# Patient Record
Sex: FEMALE | Race: BLACK OR AFRICAN AMERICAN | NOT HISPANIC OR LATINO | Employment: OTHER | URBAN - METROPOLITAN AREA
[De-identification: names, ages, dates, MRNs, and addresses within clinical notes are randomized per-mention and may not be internally consistent; named-entity substitution may affect disease eponyms.]

---

## 2022-02-11 ENCOUNTER — OFFICE VISIT (OUTPATIENT)
Dept: INTERNAL MEDICINE CLINIC | Facility: CLINIC | Age: 62
End: 2022-02-11
Payer: COMMERCIAL

## 2022-02-11 VITALS
SYSTOLIC BLOOD PRESSURE: 146 MMHG | OXYGEN SATURATION: 99 % | BODY MASS INDEX: 39.27 KG/M2 | TEMPERATURE: 98 F | HEART RATE: 80 BPM | HEIGHT: 67 IN | DIASTOLIC BLOOD PRESSURE: 90 MMHG | WEIGHT: 250.2 LBS

## 2022-02-11 DIAGNOSIS — R03.0 BORDERLINE HYPERTENSION: ICD-10-CM

## 2022-02-11 DIAGNOSIS — Z00.00 ANNUAL PHYSICAL EXAM: Primary | ICD-10-CM

## 2022-02-11 DIAGNOSIS — R21 RASH: ICD-10-CM

## 2022-02-11 DIAGNOSIS — Z11.59 NEED FOR HEPATITIS C SCREENING TEST: ICD-10-CM

## 2022-02-11 DIAGNOSIS — Z12.31 ENCOUNTER FOR SCREENING MAMMOGRAM FOR BREAST CANCER: ICD-10-CM

## 2022-02-11 PROCEDURE — 99386 PREV VISIT NEW AGE 40-64: CPT | Performed by: FAMILY MEDICINE

## 2022-02-11 RX ORDER — CLOTRIMAZOLE AND BETAMETHASONE DIPROPIONATE 10; .64 MG/G; MG/G
CREAM TOPICAL 2 TIMES DAILY
Qty: 30 G | Refills: 0 | Status: SHIPPED | OUTPATIENT
Start: 2022-02-11

## 2022-02-11 RX ORDER — FLUTICASONE PROPIONATE 50 MCG
2 SPRAY, SUSPENSION (ML) NASAL AS NEEDED
COMMUNITY
Start: 2014-01-21

## 2022-02-11 RX ORDER — LORATADINE AND PSEUDOEPHEDRINE SULFATE 5; 120 MG/1; MG/1
1 TABLET, EXTENDED RELEASE ORAL AS NEEDED
COMMUNITY
Start: 2014-01-21

## 2022-02-11 NOTE — PROGRESS NOTES
ADULT ANNUAL [address]     NAME: Shmuel Young  AGE: 59 y o  SEX: female  : 1960     DATE: 2022     Assessment and Plan:     Problem List Items Addressed This Visit     None      Visit Diagnoses     Annual physical exam    -  Primary    Relevant Orders    Comprehensive metabolic panel (Completed)    Lipid Panel with Direct LDL reflex (Completed)    TSH, 3rd generation with Free T4 reflex (Completed)    Need for hepatitis C screening test        Relevant Orders    Hepatitis C Antibody ([facility]) (Completed)    Encounter for immunization        Encounter for screening mammogram for breast cancer        Relevant Orders    Mammo screening bilateral w 3d & cad    Rash        Relevant Medications    clotrimazole-betamethasone (LOTRISONE) 1-0 05 % cream    Borderline hypertension          Borderline HTN  pt encouraged to keep ambulatory BP log and f/u in a few weeks  Refill of Lotrisone given for recurrent dermatitis per pt  Screening labs ordered  Immunizations and preventive care screenings were discussed with patient today  Appropriate education was printed on patient's after visit summary  Counseling:  · Exercise: the importance of regular exercise/physical activity was discussed  Recommend exercise 3-5 times per week for at least 30 minutes  BMI Counseling: Body mass index is 39 19 kg/m²  The BMI is above normal  Nutrition recommendations include encouraging healthy choices of fruits and vegetables, limiting drinks that contain sugar and reducing intake of cholesterol  Exercise recommendations include exercising 3-5 times per week  Rationale for BMI follow-up plan is due to patient being overweight or obese  Depression Screening and Follow-up Plan: Patient was screened for depression during today's encounter  They screened negative with a PHQ-2 score of 0          Return in about 3 weeks (around 3/4/2022) for Recheck  Chief Complaint:     Chief Complaint   Patient presents with    Hypertension     Patient stated she was in urgent care a few days ago and her BP was about 170/100 and her BP has never been that high ever as per the patient     Mass     Patient noticed a lump on her left arm as per the patient and she would like to have it looked at      History of Present Illness:     Adult Annual Physical   Patient here for a comprehensive physical exam    Denies chronic medical illness  Family hx htn and dm2    Family hx of breast cancer or colon cancer   C/o lump on left forearm  noticed it on Sunday  Doesn't bother her  Thinks it may be bug bite  Went to the urgent care a few days ago and BP was very high  No hx of hypertension  Never on medication  Diet and Physical Activity  · Diet/Nutrition: limited junk food  · Exercise: walking  Depression Screening  PHQ-2/9 Depression Screening    Little interest or pleasure in doing things: 0 - not at all  Feeling down, depressed, or hopeless: 0 - not at all  PHQ-2 Score: 0  PHQ-2 Interpretation: Negative depression screen       General Health  · Sleep: gets 4-6 hours of sleep on average  · Hearing: normal - bilateral   · Vision: no vision problems  · Dental: regular dental visits  /GYN Health  · Patient is: postmenopausal  · Last mammo-yearsa  · Last colonoscopy -2018     Review of Systems:     Review of Systems   Respiratory: Negative for shortness of breath  Cardiovascular: Negative for chest pain  Gastrointestinal: Negative for constipation and diarrhea  Genitourinary: Negative for dysuria, frequency and hematuria  Musculoskeletal: Negative for back pain and gait problem  Past Medical History:     Past Medical History:   Diagnosis Date    Asthma       Past Surgical History:     History reviewed  No pertinent surgical history     Social History:     Social History     Socioeconomic History    Marital status: Single     Spouse name: None    Number of children: None    Years of education: None    Highest education level: None   Occupational History    None   Tobacco Use    Smoking status: Never Smoker    Smokeless tobacco: Never Used   Vaping Use    Vaping Use: Never used   Substance and Sexual Activity    Alcohol use: Yes     Alcohol/week: 2 0 standard drinks     Types: 2 Glasses of wine per week     Comment: on ocassion     Drug use: Never    Sexual activity: Not Currently   Other Topics Concern    None   Social History Narrative    None     Social Determinants of Health     Financial Resource Strain: Not on file   Food Insecurity: Not on file   Transportation Needs: Not on file   Physical Activity: Not on file   Stress: Not on file   Social Connections: Not on file   Intimate Partner Violence: Not on file   Housing Stability: Not on file      Family History:     Family History   Problem Relation Age of Onset    Diabetes Mother     Hypertension Mother     Diabetes Father     No Known Problems Sister     No Known Problems Maternal Grandmother     No Known Problems Maternal Grandfather     No Known Problems Paternal Grandmother     No Known Problems Paternal Grandfather     No Known Problems Paternal Aunt       Current Medications:     Current Outpatient Medications   Medication Sig Dispense Refill    fluticasone (FLONASE) 50 mcg/act nasal spray 2 sprays into each nostril as needed As needed       loratadine-pseudoephedrine (Claritin-D 12 Hour) 5-120 mg per tablet Take 1 tablet by mouth as needed        clotrimazole-betamethasone (LOTRISONE) 1-0 05 % cream Apply topically 2 (two) times a day 30 g 0     No current facility-administered medications for this visit  Allergies:      Allergies   Allergen Reactions    Fruit & Vegetable Daily [Fish Oil - Food Allergy] Itching     Itching in the mouth    Doxycycline Hives      Physical Exam:     /90 (BP Location: Left arm, Patient Position: Sitting, Cuff Size: Standard) Comment: bp  Pulse 80   Temp 98 °F (36 7 °C) (Temporal) Comment: NO NSAIDS  Ht 5' 7" (1 702 m)   Wt 113 kg (250 lb 3 2 oz)   SpO2 99%   BMI 39 19 kg/m²     Physical Exam  HENT:      Head: Normocephalic and atraumatic  Right Ear: Tympanic membrane and external ear normal       Left Ear: Tympanic membrane and external ear normal    Eyes:      Conjunctiva/sclera: Conjunctivae normal       Pupils: Pupils are equal, round, and reactive to light  Cardiovascular:      Rate and Rhythm: Normal rate and regular rhythm  Heart sounds: No murmur heard  Pulmonary:      Effort: Pulmonary effort is normal       Breath sounds: Normal breath sounds  Abdominal:      General: Bowel sounds are normal       Palpations: Abdomen is soft  Musculoskeletal:      Right lower leg: No edema  Left lower leg: No edema  Skin:     Comments: Non tender, non erythematous subcutaneous nodule on left forearm  Small  probable lipoma    Neurological:      Mental Status: She is alert and oriented to person, place, and time        Gait: Gait normal           Shanice Montes De Oca, DO  MEDICAL [address] [facility] [address]

## 2022-02-11 NOTE — PATIENT INSTRUCTIONS

## 2022-02-24 ENCOUNTER — HOSPITAL ENCOUNTER (OUTPATIENT)
Dept: MAMMOGRAPHY | Facility: CLINIC | Age: 62
Discharge: HOME | End: 2022-02-24
Payer: COMMERCIAL

## 2022-02-24 ENCOUNTER — APPOINTMENT (OUTPATIENT)
Dept: LAB | Facility: CLINIC | Age: 62
End: 2022-02-24
Payer: COMMERCIAL

## 2022-02-24 VITALS — WEIGHT: 245 LBS | HEIGHT: 67 IN | BODY MASS INDEX: 38.45 KG/M2

## 2022-02-24 DIAGNOSIS — Z00.00 ANNUAL PHYSICAL EXAM: ICD-10-CM

## 2022-02-24 DIAGNOSIS — Z12.31 ENCOUNTER FOR SCREENING MAMMOGRAM FOR BREAST CANCER: ICD-10-CM

## 2022-02-24 DIAGNOSIS — Z11.59 NEED FOR HEPATITIS C SCREENING TEST: ICD-10-CM

## 2022-02-24 LAB
ALBUMIN SERPL BCP-MCNC: 3.7 G/DL (ref 3.5–5)
ALP SERPL-CCNC: 73 U/L (ref 46–116)
ALT SERPL W P-5'-P-CCNC: 20 U/L (ref 12–78)
ANION GAP SERPL CALCULATED.3IONS-SCNC: 4 MMOL/L (ref 4–13)
AST SERPL W P-5'-P-CCNC: 15 U/L (ref 5–45)
BILIRUB SERPL-MCNC: 0.53 MG/DL (ref 0.2–1)
BUN SERPL-MCNC: 16 MG/DL (ref 5–25)
CALCIUM SERPL-MCNC: 9.6 MG/DL (ref 8.3–10.1)
CHLORIDE SERPL-SCNC: 108 MMOL/L (ref 100–108)
CHOLEST SERPL-MCNC: 194 MG/DL
CO2 SERPL-SCNC: 28 MMOL/L (ref 21–32)
CREAT SERPL-MCNC: 1.15 MG/DL (ref 0.6–1.3)
GFR SERPL CREATININE-BSD FRML MDRD: 50 ML/MIN/1.73SQ M
GLUCOSE P FAST SERPL-MCNC: 112 MG/DL (ref 65–99)
HCV AB SER QL: NORMAL
HDLC SERPL-MCNC: 61 MG/DL
LDLC SERPL CALC-MCNC: 124 MG/DL (ref 0–100)
POTASSIUM SERPL-SCNC: 4.9 MMOL/L (ref 3.5–5.3)
PROT SERPL-MCNC: 7.9 G/DL (ref 6.4–8.2)
SODIUM SERPL-SCNC: 140 MMOL/L (ref 136–145)
T4 FREE SERPL-MCNC: 1.14 NG/DL (ref 0.76–1.46)
TRIGL SERPL-MCNC: 44 MG/DL
TSH SERPL DL<=0.05 MIU/L-ACNC: 0.25 UIU/ML (ref 0.36–3.74)

## 2022-02-24 PROCEDURE — 77067 SCR MAMMO BI INCL CAD: CPT

## 2022-02-24 PROCEDURE — 84443 ASSAY THYROID STIM HORMONE: CPT

## 2022-02-24 PROCEDURE — 80061 LIPID PANEL: CPT

## 2022-02-24 PROCEDURE — 77063 BREAST TOMOSYNTHESIS BI: CPT

## 2022-02-24 PROCEDURE — 86803 HEPATITIS C AB TEST: CPT

## 2022-02-24 PROCEDURE — 36415 COLL VENOUS BLD VENIPUNCTURE: CPT

## 2022-02-24 PROCEDURE — 84439 ASSAY OF FREE THYROXINE: CPT

## 2022-02-24 PROCEDURE — 80053 COMPREHEN METABOLIC PANEL: CPT

## 2022-03-02 ENCOUNTER — TELEPHONE (OUTPATIENT)
Dept: INTERNAL MEDICINE CLINIC | Facility: CLINIC | Age: 62
End: 2022-03-02

## 2022-03-02 PROBLEM — R03.0 BORDERLINE HYPERTENSION: Status: ACTIVE | Noted: 2022-03-02

## 2022-03-02 NOTE — TELEPHONE ENCOUNTER
----- Message from Lucinda Bernard DO sent at 3/2/2022 12:22 PM EST -----  Please notify pt that her blood sugar and cholesterol were slightly elevated  Her thyroid hormone was slightly low  This needs to be monitored  I have ordered repeat  cholesterol and blood sugar testing to have completed in 3 months  She is to get her thyroid test done in the next week

## 2022-03-04 ENCOUNTER — OFFICE VISIT (OUTPATIENT)
Dept: INTERNAL MEDICINE CLINIC | Facility: CLINIC | Age: 62
End: 2022-03-04
Payer: COMMERCIAL

## 2022-03-04 VITALS
RESPIRATION RATE: 16 BRPM | SYSTOLIC BLOOD PRESSURE: 134 MMHG | OXYGEN SATURATION: 98 % | HEIGHT: 67 IN | BODY MASS INDEX: 38.24 KG/M2 | WEIGHT: 243.6 LBS | DIASTOLIC BLOOD PRESSURE: 72 MMHG | TEMPERATURE: 98 F | HEART RATE: 95 BPM

## 2022-03-04 DIAGNOSIS — E05.90 SUBCLINICAL HYPERTHYROIDISM: Primary | ICD-10-CM

## 2022-03-04 DIAGNOSIS — R03.0 BORDERLINE HYPERTENSION: ICD-10-CM

## 2022-03-04 PROCEDURE — 99214 OFFICE O/P EST MOD 30 MIN: CPT | Performed by: FAMILY MEDICINE

## 2022-03-04 PROCEDURE — 3008F BODY MASS INDEX DOCD: CPT | Performed by: FAMILY MEDICINE

## 2022-03-04 PROCEDURE — 1036F TOBACCO NON-USER: CPT | Performed by: FAMILY MEDICINE

## 2022-03-04 NOTE — PROGRESS NOTES
FOLLOW-UP OFFICE VISIT  [facility]    NAME: Thuy Simeon  AGE: 59 y o  SEX: female  : 1960     DATE: 3/4/2022     Assessment and Plan:     Problem List Items Addressed This Visit        Endocrine    Subclinical hyperthyroidism - Primary       Other    Borderline hypertension        Plan; BP not elevated today  ambulatory BP are normal to borderline  lifestyle modification  encouraged  tsh slightly suppressed at 0 2 with a normal T4  will continue to monitor  If Less than 0 1 would consider treatment  Return in about 6 months (around 2022) for Next scheduled follow up  Chief Complaint:     Chief Complaint   Patient presents with    Follow-up     3 week w labs         History of Present Illness:     Pt presents for BP f/u and to review lab  Pt has been keeping ambulatory Bps  Overall her Bps at home have been 130s 140s / 80-70s  Review of Systems:     Review of Systems   Constitutional: Negative for fever  Respiratory: Negative for shortness of breath  Cardiovascular: Negative for chest pain and palpitations  Problem List:     Patient Active Problem List   Diagnosis    Borderline hypertension    Subclinical hyperthyroidism        Objective:     /72 (BP Location: Left arm, Patient Position: Sitting, Cuff Size: Large)   Pulse 95   Temp 98 °F (36 7 °C) (Tympanic)   Resp 16   Ht 5' 7" (1 702 m)   Wt 110 kg (243 lb 9 6 oz)   SpO2 98%   BMI 38 15 kg/m²     Physical Exam  HENT:      Head: Normocephalic and atraumatic  Right Ear: External ear normal       Left Ear: External ear normal    Cardiovascular:      Rate and Rhythm: Normal rate  Pulmonary:      Effort: Pulmonary effort is normal    Neurological:      Mental Status: She is alert and oriented to person, place, and time           Pertinent Laboratory/Diagnostic Studies:    Laboratory Results: I have personally reviewed the pertinent laboratory results/reports     Chemistry Profile:   Results from Last 12 Months   Lab Units 02/24/22  1016   POTASSIUM mmol/L 4 9   CHLORIDE mmol/L 108   CO2 mmol/L 28   BUN mg/dL 16   CREATININE mg/dL 1 15   GLUCOSE FASTING mg/dL 112*   CALCIUM mg/dL 9 6   AST U/L 15   ALT U/L 20   ALK PHOS U/L 73   EGFR ml/min/1 73sq m 50     Endocrine Studies:   Results from Last 12 Months   Lab Units 02/24/22  1016   TSH 3RD GENERATON uIU/mL 0 254*   FREE T4 ng/dL 1 14   TRIGLYCERIDES mg/dL 44   CHOLESTEROL mg/dL 194   HDL mg/dL 61   LDL CALC mg/dL 124*       Radiology/Other Diagnostic Testing Results: I have personally reviewed pertinent reports          Lani TAVARES HSPTLCherlyn Sicard [facility]  3/4/2022 12:43 PM

## 2022-03-04 NOTE — PATIENT INSTRUCTIONS
DASH Eating Plan   WHAT YOU NEED TO KNOW:   What is the DASH Eating Plan? The DASH (Dietary Approaches to Stop Hypertension) Eating Plan is designed to help prevent or lower high blood pressure  It can also help to lower LDL (bad) cholesterol and decrease your risk for heart disease  The plan is low in sodium, sugar, unhealthy fats, and total fat  It is high in potassium, calcium, magnesium, and fiber  These nutrients are added when you eat more fruits, vegetables, and whole grains  With the DASH eating plan, you need to eat a certain number of servings from each food group  This will help you get enough of certain nutrients and limit others  The amount of servings you should eat depends on how many calories you need  Your dietitian can help you create meal plans with the right number of servings for each food group  What do I need to know about sodium? Your dietitian will tell you how much sodium is safe for you to have each day  People with high blood pressure should have no more than 1,500 to 2,300 mg of sodium in a day  A teaspoon (tsp) of salt has 2,300 mg of sodium  This may seem like a difficult goal, but small changes to the foods you eat can make a big difference  Your healthcare provider or dietitian can help you create a meal plan that follows your sodium limit  · Read food labels  Food labels can help you choose foods that are low in sodium  The amount of sodium is listed in milligrams (mg)  The % Daily Value (DV) column tells you how much of your daily needs are met by 1 serving of the food for each nutrient listed  Choose foods that have less than 5% of the DV of sodium  These foods are considered low in sodium  Foods that have 20% or more of the DV of sodium are considered high in sodium  Avoid foods that have more than 300 mg of sodium in each serving  Choose foods that say low-sodium, reduced-sodium, or no salt added on the food label  · Limit added salt    Do not salt food at the table if you add salt when you cook  Use herbs and spices, such as onions, garlic, and salt-free seasonings to add flavor  Try lemon or lime juice or vinegar to add a tart flavor  Use hot peppers or a small amount of hot pepper sauce to add a spicy flavor  Limit foods high in added salt, such as the following:    ? Seasonings made with salt, such as garlic salt, celery salt, onion salt, seasoned salt, meat tenderizers, and monosodium glutamate (MSG)    ? Miso soup and canned or dried soup mixes    ? Regular soy sauce, barbecue sauce, teriyaki sauce, steak sauce, Worcestershire sauce, and most flavored vinegars    ? Snack foods, such as salted chips, popcorn, pretzels, pork rinds, salted crackers, and salted nuts    ? Frozen foods, such as dinners, entrees, vegetables with sauces, and breaded meats    · Ask about salt substitutes  Ask your healthcare provider if you may use salt substitutes  Some salt substitutes have ingredients that can be harmful if you have certain health conditions  · Choose foods carefully at restaurants  Meals from restaurants, especially fast food restaurants, are often high in sodium  Some restaurants have nutrition information that tells you the amount of sodium in their foods  Ask to have your food prepared with less, or no salt  What do I need to know about fats? Healthy fats include unsaturated fats and omega-3 fatty acids  Unhealthy fats include saturated fats and trans fats  · Include healthy fats, such as the following:      ? Cooking oils, such as soybean, canola, olive, or sunflower    ? Fatty fish, such as salmon, tuna, mackerel, or sardines    ? Flaxseed oil or ground flaxseed    ? ½ cup of cooked beans, such as black beans, kidney beans, or velarde beans    ? 1½ ounces of low-sodium nuts, such as almonds or walnuts    ? Low-sugar, low-sodium peanut butter    ?  Seeds such as jose seeds or sunflower seeds       · Limit or do not have unhealthy fats, such as the following: ? Foods that contain fat from animals, such as fatty meats, whole milk, butter, and cream    ? Shortening, stick margarine, palm oil, and coconut oil    ? Full-fat or creamy salad dressing    ? Creamy soup    ? Crackers, chips, and baked goods made with margarine or shortening    ? Foods that are fried in unhealthy fats    ? Gravy and sauces, such as Farhad or cheese sauces    What do I need to know about carbohydrates (carbs)? All carbs break down into sugar  Complex carbs contain more fiber than simple carbs  This means complex carbs go into the bloodstream more slowly and cause less of a blood sugar spike  Try to include more complex carbs and fewer simple carbs  · Include complex carbs, such as the following:      ? 1 slice of whole-grain bread    ? 1 ounce of dry cereal that does not contain added sugar    ? ½ cup of cooked oatmeal    ? 2 ounces of cooked whole-grain pasta    ? ½ cup of cooked brown rice    · Limit or do not have simple carbs, such as the following:      ? Baked goods, such as doughnuts, pastries, and cookies    ? Mixes for cornbread and biscuits    ? White rice and pasta mixes, such as boxed macaroni and cheese    ? Instant and cold cereals that contain sugar    ? Jelly, jam, and ice cream that contain sugar    ? Condiments such as ketchup    ? Drinks high in sugar, such as soft drinks, lemonade, and fruit juice    What do I need to know about vegetables and fruits? Vegetables and fruits can be fresh, frozen, or canned  If possible, try to choose low-sodium canned options  · Include a variety of vegetables and fruits, such as the following:      ? 1 medium apple, pear, or peach (about ½ cup chopped)    ? ½ small banana    ? ½ cup berries, such as blueberries, strawberries, or blackberries    ? 1 cup of raw leafy greens, such as lettuce, spinach, kale, or casandra greens    ? ½ cup of frozen or canned (no added salt) vegetables, such as green beans    ?  ½ cup of fresh, frozen, or canned fruit (canned in light syrup or fruit juice)    ? ½ cup of vegetable or fruit juice    · Limit or do not have vegetables and fruits made in the following ways:      ? Frozen fruit such as cherries that have added sugar    ? Fruit in cream or butter sauce    ? Canned vegetables that are high in sodium    ? Sauerkraut, pickled vegetables, and other foods prepared in brine    ? Fried vegetables or vegetables in butter or high-fat sauces    What do I need to know about protein foods? · Include lean or low-fat protein foods, such as the following:      ? Poultry (chicken, turkey) with no skin    ? Fish (especially fatty fish, such as salmon, fresh tuna, or mackerel)    ? Lean beef and pork (loin, round, extra lean hamburger)    ? Egg whites and egg substitutes    ? 1 cup of nonfat (skim) or 1% milk    ? 1½ ounces of fat-free or low-fat cheese    ? 6 ounces of nonfat or low-fat yogurt    · Limit or do not have high-fat protein foods, such as the following:      ? Smoked or cured meat, such as corned beef, villela, ham, hot dogs, and sausage    ? Canned beans and canned meats or spreads, such as potted meats, sardines, anchovies, and imitation seafood    ? Deli or lunch meats, such as bologna, ham, turkey, and roast beef    ? High-fat meat (T-bone steak, regular hamburger, and ribs)    ? Whole eggs and egg yolks    ? Whole milk, 2% milk, and cream    ? Regular cheese and processed cheese    What other guidelines should I follow? · Maintain a healthy weight  Your risk for heart disease is higher if you are overweight  Your healthcare provider may suggest that you lose weight if you are overweight  You can lose weight by eating fewer calories and foods that have added sugars and fat  The DASH meal plan can help you do this  Decrease calories by eating smaller portions at each meal and fewer snacks  Ask your healthcare provider for more information about how to lose weight  · Exercise regularly    Regular exercise can help you reach or maintain a healthy weight  Regular exercise can also help decrease your blood pressure and improve your cholesterol levels  Get 30 minutes or more of moderate exercise each day of the week  To lose weight, get at least 60 minutes of exercise  Talk to your healthcare provider about the best exercise program for you  · Limit alcohol  Women should limit alcohol to 1 drink a day  Men should limit alcohol to 2 drinks a day  A drink of alcohol is 12 ounces of beer, 5 ounces of wine, or 1½ ounces of liquor  Where can I find more information? · [facility] and [address]  [address] [address]  [place] [address]  Phone: 2- 530 - [phone]  Web Address: [facility] no    [facility] AGREEMENT:   You have the right to help plan your care  Discuss treatment options with your healthcare provider to decide what care you want to receive  You always have the right to refuse treatment  The above information is an  only  It is not intended as medical advice for individual conditions or treatments  Talk to your doctor, nurse or pharmacist before following any medical regimen to see if it is safe and effective for you  © Copyright [employer] 2022 Information is for End User's use only and may not be sold, redistributed or otherwise used for commercial purposes   All illustrations and images included in CareNotes® are the copyrighted property of A D A M , Inc  or [address] [place] [address] [facility]

## 2022-03-23 ENCOUNTER — APPOINTMENT (OUTPATIENT)
Dept: LAB | Facility: CLINIC | Age: 62
End: 2022-03-23
Payer: COMMERCIAL

## 2022-03-23 ENCOUNTER — TELEPHONE (OUTPATIENT)
Dept: INTERNAL MEDICINE CLINIC | Facility: CLINIC | Age: 62
End: 2022-03-23

## 2022-03-23 DIAGNOSIS — R79.89 LOW TSH LEVEL: ICD-10-CM

## 2022-03-23 DIAGNOSIS — E05.90 HYPERTHYROIDISM: Primary | ICD-10-CM

## 2022-03-23 LAB
T4 FREE SERPL-MCNC: 0.97 NG/DL (ref 0.76–1.46)
TSH SERPL DL<=0.05 MIU/L-ACNC: 0.22 UIU/ML (ref 0.36–3.74)

## 2022-03-23 PROCEDURE — 84443 ASSAY THYROID STIM HORMONE: CPT

## 2022-03-23 PROCEDURE — 36415 COLL VENOUS BLD VENIPUNCTURE: CPT

## 2022-03-23 PROCEDURE — 84439 ASSAY OF FREE THYROXINE: CPT

## 2022-03-23 NOTE — TELEPHONE ENCOUNTER
----- Message from Sonia Galvan DO sent at 3/23/2022  5:00 PM EDT -----  Please call pt and clarify when she started her thyroid medicine in relation to this most recent lab  If it has been less than a week we will have to repeat it  If it has been at least two week we may need to increase dose

## 2022-03-23 NOTE — TELEPHONE ENCOUNTER
Pt wasn't rx'd any thyroid medication, was only told to redo labs from last appt in 2-3 weeks    She did the labs today

## 2022-03-24 PROBLEM — E05.90 SUBCLINICAL HYPERTHYROIDISM: Status: RESOLVED | Noted: 2022-03-04 | Resolved: 2022-03-24

## 2022-03-24 PROBLEM — E05.90 HYPERTHYROIDISM: Status: ACTIVE | Noted: 2022-03-24

## 2022-03-24 RX ORDER — METHIMAZOLE 5 MG/1
5 TABLET ORAL 3 TIMES DAILY
Qty: 90 TABLET | Refills: 0 | Status: SHIPPED | OUTPATIENT
Start: 2022-03-24 | End: 2022-04-29

## 2022-03-24 NOTE — TELEPHONE ENCOUNTER
Please notify pt that medication methimazole 5mg three times a day has been sent to pharmacy  She is to get repeat TSH completed 2 weeks after starting the medication

## 2022-04-02 ENCOUNTER — TELEPHONE (OUTPATIENT)
Dept: INTERNAL MEDICINE CLINIC | Facility: CLINIC | Age: 62
End: 2022-04-02

## 2022-04-02 NOTE — TELEPHONE ENCOUNTER
PT  SAID  SHE  IS  TAKING  METHIMAZOLE  AND  NOW  SHE  HAS   RASH   WANTS  TO  KNOW  IF  SHE IS  HAVING  A  REACTION  TO  THIS  RX      PT  SAID  SHE  ALSO  HAD  THE  COVID  BOOSTER   SHOT   THIS  WEEK  AND  HASN'T  BEEN  FEELING   GOOD

## 2022-04-02 NOTE — TELEPHONE ENCOUNTER
Apparently methimazole is a relatively new prescription  Stop it and see what happens with the rash

## 2022-04-06 ENCOUNTER — TELEPHONE (OUTPATIENT)
Dept: INTERNAL MEDICINE CLINIC | Facility: CLINIC | Age: 62
End: 2022-04-06

## 2022-04-06 NOTE — TELEPHONE ENCOUNTER
Pt WAS ADVISED TO CALL LIANNE BACK TODAY TO LET HER KNOW HOW SHE WAS DOING  /  PT HAD RASH AND WOULD LIKE LIANNE TO RETURN HER CALL PLS    [phone]

## 2022-04-06 NOTE — TELEPHONE ENCOUNTER
Pt returning call to [facility]   Her rash is doing better; subsided a little    Please call her back

## 2022-04-06 NOTE — TELEPHONE ENCOUNTER
S/w Pt rash has resolved 97 % since d/c of methimazole the patient would like to know if something can be prescribed to replace it

## 2022-04-08 ENCOUNTER — OFFICE VISIT (OUTPATIENT)
Dept: INTERNAL MEDICINE CLINIC | Facility: CLINIC | Age: 62
End: 2022-04-08
Payer: COMMERCIAL

## 2022-04-08 VITALS
HEART RATE: 77 BPM | OXYGEN SATURATION: 98 % | SYSTOLIC BLOOD PRESSURE: 128 MMHG | BODY MASS INDEX: 37.83 KG/M2 | HEIGHT: 67 IN | DIASTOLIC BLOOD PRESSURE: 84 MMHG | WEIGHT: 241 LBS | TEMPERATURE: 97.2 F | RESPIRATION RATE: 18 BRPM

## 2022-04-08 DIAGNOSIS — E05.90 HYPERTHYROIDISM: Primary | ICD-10-CM

## 2022-04-08 DIAGNOSIS — T50.905A ADVERSE EFFECT OF DRUG, INITIAL ENCOUNTER: ICD-10-CM

## 2022-04-08 PROCEDURE — 99213 OFFICE O/P EST LOW 20 MIN: CPT

## 2022-04-08 NOTE — PROGRESS NOTES
[facility]    NAME: Bossman Junior  AGE: 59 y o  SEX: female  : 1960     DATE: 2022     Assessment and Plan:     1  Hyperthyroidism  Patient with history of subclinical hyperthyroid  Her TSH on 2022 was 0 216 and free T4 0 7  She was started on methimazole 5 mg t i d  She developed a rash shortly after starting it  She stopped taking it on   She states the rash is resolving  Will repeat TSH in next week and follow up with her at that time  She does have an appointment scheduled with endocrinology in   Adverse effect of drug, initial encounter  Hold methimazole at this time  Return in about 2 weeks (around 2022) for Recheck  Chief Complaint:     Chief Complaint   Patient presents with    Medication Reaction     thyroid med was causing rash        History of Present Illness:     Catarina Bradley presents to discuss a possible reaction to her new thyroid medication  She was started on methimazole 5 mg t i d  at the end March  She states that she started to develop a rash on her legs and a few on her arms  She described little red bumps in flesh-colored bumps that were itchy  She states she also had her COVID booster last Tuesday which she is unsure if it worsened symptoms  She called the office and was instructed by another provider to stop taking the methimazole  She states the rash is resolving  I have instructed her to hold off on taking the methimazole at this time  Will repeat TSH next week and follow-up with her  She does have an appoint with endocrinology scheduled in August   Will reach out to them after TSH for recommendation  Review of Systems:     Review of Systems   Constitutional: Negative  HENT: Negative  Cardiovascular: Negative  Gastrointestinal: Negative  Genitourinary: Negative  Musculoskeletal: Negative  Skin: Positive for rash (Resolving)     Neurological: Negative  Psychiatric/Behavioral: Negative  Problem List:     Patient Active Problem List   Diagnosis    Borderline hypertension    Hyperthyroidism        Objective:     /84 (BP Location: Left arm, Patient Position: Sitting, Cuff Size: Standard)   Pulse 77   Temp (!) 97 2 °F (36 2 °C) (Tympanic)   Resp 18   Ht 5' 7" (1 702 m)   Wt 109 kg (241 lb)   SpO2 98%   BMI 37 75 kg/m²     Physical Exam  Constitutional:       General: She is not in acute distress  Appearance: She is obese  HENT:      Head: Normocephalic and atraumatic  Right Ear: External ear normal       Left Ear: External ear normal       Nose: Nose normal       Mouth/Throat:      Mouth: Mucous membranes are moist       Pharynx: Oropharynx is clear  Eyes:      Conjunctiva/sclera: Conjunctivae normal    Cardiovascular:      Rate and Rhythm: Normal rate and regular rhythm  Pulses: Normal pulses  Heart sounds: Normal heart sounds  No murmur heard  Pulmonary:      Effort: Pulmonary effort is normal       Breath sounds: Normal breath sounds  Abdominal:      Palpations: Abdomen is soft  Tenderness: There is no abdominal tenderness  Musculoskeletal:         General: Normal range of motion  Cervical back: Neck supple  Skin:     General: Skin is warm and dry  Capillary Refill: Capillary refill takes less than 2 seconds  Neurological:      Mental Status: She is alert and oriented to person, place, and time  Psychiatric:         Mood and Affect: Mood normal          Behavior: Behavior normal          Thought Content: Thought content normal          Judgment: Judgment normal          I spent 15 minutes with this patient      [address] [place] [address]  [facility] [address] [place] [address]

## 2022-04-08 NOTE — PATIENT INSTRUCTIONS
Hold Methimazole  Repeat labs next week    Hyperthyroidism   WHAT YOU NEED TO KNOW:   Hyperthyroidism is a condition that develops when your thyroid hormone levels are high  Thyroid hormones help control body temperature, heart rate, growth, and weight  DISCHARGE INSTRUCTIONS:   Call 911 for any of the following:   · You have sudden chest pain or shortness of breath  · You have a seizure  · Your heart is beating faster than usual     · You feel like you are going to faint  Contact your healthcare provider if:   · You have a fever  · You feel nervous and restless  · You have chills, a cough, or feel weak and achy  · You run out of medicine or have stopped taking it  · You have questions or concerns about your condition or care  Medicines:   · Antithyroid medicines  decrease thyroid hormone levels and your symptoms  · Take your medicine as directed  Contact your healthcare provider if you think your medicine is not helping or if you have side effects  Tell him or her if you are allergic to any medicine  Keep a list of the medicines, vitamins, and herbs you take  Include the amounts, and when and why you take them  Bring the list or the pill bottles to follow-up visits  Carry your medicine list with you in case of an emergency  Rest as directed: You many need to avoid stressful and heavy physical activities  Slowly start to do more each day  Return to your daily activities as directed  Nutrition:  You may need to eat more to give your body the extra energy it needs  Foods high in protein will help prevent weight loss  Ask your healthcare provider which foods are best for you  Follow up with your healthcare provider as directed: You may need to return for more blood tests to check your thyroid hormone level  This will show if you are getting the right amount of medicine   Do not stop taking your medicines without talking to your healthcare provider first  Write down your questions so you remember to ask them during your visits  © Copyright [employer] 2022 Information is for End User's use only and may not be sold, redistributed or otherwise used for commercial purposes  All illustrations and images included in CareNotes® are the copyrighted property of [employer] M [employer] Inc  or [employer]  The above information is an  only  It is not intended as medical advice for individual conditions or treatments  Talk to your doctor, nurse or pharmacist before following any medical regimen to see if it is safe and effective for you

## 2022-04-15 ENCOUNTER — APPOINTMENT (OUTPATIENT)
Dept: LAB | Facility: CLINIC | Age: 62
End: 2022-04-15
Payer: COMMERCIAL

## 2022-04-15 DIAGNOSIS — E05.90 HYPERTHYROIDISM: ICD-10-CM

## 2022-04-15 LAB — TSH SERPL DL<=0.05 MIU/L-ACNC: 0.55 UIU/ML (ref 0.45–4.5)

## 2022-04-15 PROCEDURE — 36415 COLL VENOUS BLD VENIPUNCTURE: CPT

## 2022-04-15 PROCEDURE — 84443 ASSAY THYROID STIM HORMONE: CPT

## 2022-04-29 ENCOUNTER — OFFICE VISIT (OUTPATIENT)
Dept: INTERNAL MEDICINE CLINIC | Facility: CLINIC | Age: 62
End: 2022-04-29
Payer: COMMERCIAL

## 2022-04-29 VITALS
TEMPERATURE: 98.4 F | HEIGHT: 67 IN | RESPIRATION RATE: 20 BRPM | DIASTOLIC BLOOD PRESSURE: 74 MMHG | SYSTOLIC BLOOD PRESSURE: 122 MMHG | WEIGHT: 243 LBS | OXYGEN SATURATION: 99 % | BODY MASS INDEX: 38.14 KG/M2 | HEART RATE: 74 BPM

## 2022-04-29 DIAGNOSIS — R42 DIZZINESS, NONSPECIFIC: ICD-10-CM

## 2022-04-29 DIAGNOSIS — E05.90 HYPERTHYROIDISM: Primary | ICD-10-CM

## 2022-04-29 DIAGNOSIS — R03.0 BORDERLINE HYPERTENSION: ICD-10-CM

## 2022-04-29 PROCEDURE — 1036F TOBACCO NON-USER: CPT

## 2022-04-29 PROCEDURE — 3008F BODY MASS INDEX DOCD: CPT

## 2022-04-29 PROCEDURE — 3725F SCREEN DEPRESSION PERFORMED: CPT

## 2022-04-29 PROCEDURE — 99213 OFFICE O/P EST LOW 20 MIN: CPT

## 2022-04-29 RX ORDER — MULTIVITAMIN
1 CAPSULE ORAL DAILY
COMMUNITY

## 2022-04-29 RX ORDER — OMEGA-3S/DHA/EPA/FISH OIL/D3 300MG-1000
400 CAPSULE ORAL DAILY
COMMUNITY

## 2022-04-29 RX ORDER — RIBOFLAVIN (VITAMIN B2) 100 MG
100 TABLET ORAL DAILY
COMMUNITY

## 2022-04-29 NOTE — PATIENT INSTRUCTIONS
Hyperthyroidism   AMBULATORY CARE:   Hyperthyroidism  is a condition that develops when your thyroid hormone levels are high  Thyroid hormones help control body temperature, heart rate, growth, and weight  Common signs and symptoms include the following: The signs and symptoms may develop slowly, sometimes over several years  · Weight loss, increased appetite, diarrhea, or constipation    · Increased sweating and heat intolerance    · Nervousness, restlessness, tremors, and difficulty sleeping    · Fast heart rate and fast breathing, even at rest    · Painful lump in your neck or bulging eyes    · Fatigue and muscle weakness    · Decreased or absent monthly periods    Call 911 for any of the following:   · You have sudden chest pain or shortness of breath  · You have a seizure  · Your heart is beating faster than usual     · You feel like you are going to faint  Contact your healthcare provider if:   · You have a fever  · You feel nervous and restless  · You have chills, a cough, or feel weak and achy  · You run out of medicine or have stopped taking it  · You have questions or concerns about your condition or care  Treatment:  You may not need any treatment, or you may need any of the following:  · Antithyroid medicines  decrease thyroid hormone levels and your symptoms  · Radioactive iodine  is given to damage or kill some thyroid gland cells  This may decrease the amount of thyroid hormone produced  Tell your healthcare provider if you know or think you are pregnant  This medicine can be harmful to an unborn baby  · Surgery  may be done to remove all or part of the thyroid gland  Follow up with your healthcare provider as directed: You may need to return for more blood tests to check your thyroid hormone level  This will show if you are getting the right amount of medicine   Do not stop taking your medicines without talking to your healthcare provider first  Write down your questions so you remember to ask them during your visits  © Copyright [employer] 2022 Information is for End User's use only and may not be sold, redistributed or otherwise used for commercial purposes  All illustrations and images included in CareNotes® are the copyrighted property of A D A M , Inc  or [employer]  The above information is an  only  It is not intended as medical advice for individual conditions or treatments  Talk to your doctor, nurse or pharmacist before following any medical regimen to see if it is safe and effective for you

## 2022-04-29 NOTE — PROGRESS NOTES
[facility] - [facility] OF [facility]    NAME: Annita Richardson  AGE: 59 y o  SEX: female  : 1960     DATE: 2022     Assessment and Plan:     Problem List Items Addressed This Visit        Endocrine    Hyperthyroidism - Primary     She had a rash with the methimazole  It was stopped  Most recent TSH is 0 554  She has a endocrinology consult scheduled for August   Patient was instructed to keep this appointment  Relevant Orders    TSH, 3rd generation with Free T4 reflex       Other    Borderline hypertension     Blood pressure is 122/74  Currently not taking medication  Relevant Orders    VAS carotid complete study    Dizziness, nonspecific     Reports intermittent dizziness and lightheadedness  No known trigger  She states her blood pressure is within normal limits if she checks it during these episodes  Will get a carotid study  Follow-up with patient in 4 weeks         Relevant Orders    VAS carotid complete study              No follow-ups on file  Chief Complaint:     Chief Complaint   Patient presents with    Follow-up     talk about labs, have random bouts of feeling light headed        History of Present Illness:     Tobie Cockayne presents for follow-up appointment today related to a rash secondary to starting methimazole  The rash has since resolved  Patient's repeat TSH is within normal limits  She still reports intermittent lightheadedness which is concerning to her  She states there is no consistent trigger  No complaints of chest pain or shortness of breath  Review of Systems:     Review of Systems   Constitutional: Negative  HENT: Positive for congestion  Respiratory: Negative  Cardiovascular: Negative  Gastrointestinal: Negative  Skin: Negative  Neurological: Positive for dizziness and light-headedness  Psychiatric/Behavioral: Negative           Problem List:     Patient Active Problem List   Diagnosis    Borderline hypertension    Hyperthyroidism        Objective:     /74 (BP Location: Left arm, Patient Position: Sitting, Cuff Size: Standard)   Pulse 74   Temp 98 4 °F (36 9 °C) (Tympanic)   Resp 20   Ht 5' 7" (1 702 m)   Wt 110 kg (243 lb)   SpO2 99%   BMI 38 06 kg/m²     Physical Exam  Vitals and nursing note reviewed  Constitutional:       General: She is not in acute distress  Appearance: She is obese  HENT:      Head: Normocephalic and atraumatic  Right Ear: Tympanic membrane normal       Left Ear: Tympanic membrane normal       Nose: Nose normal       Mouth/Throat:      Mouth: Mucous membranes are moist       Pharynx: Oropharynx is clear  Eyes:      Conjunctiva/sclera: Conjunctivae normal    Cardiovascular:      Rate and Rhythm: Normal rate and regular rhythm  Pulses: Normal pulses  Heart sounds: Normal heart sounds  No murmur heard  Pulmonary:      Effort: Pulmonary effort is normal       Breath sounds: Normal breath sounds  No wheezing  Musculoskeletal:         General: Normal range of motion  Cervical back: Neck supple  Skin:     General: Skin is warm and dry  Capillary Refill: Capillary refill takes less than 2 seconds  Neurological:      Mental Status: She is alert and oriented to person, place, and time  Psychiatric:         Mood and Affect: Mood normal          Behavior: Behavior normal          Thought Content: Thought content normal          Judgment: Judgment normal          I spent 15 minutes with this patient      [address] [place] [address]  [facility] OF [facility]

## 2022-04-29 NOTE — ASSESSMENT & PLAN NOTE
Reports intermittent dizziness and lightheadedness  No known trigger  She states her blood pressure is within normal limits if she checks it during these episodes  Will get a carotid study    Follow-up with patient in 4 weeks

## 2022-04-29 NOTE — ASSESSMENT & PLAN NOTE
She had a rash with the methimazole  It was stopped  Most recent TSH is 0 554  She has a endocrinology consult scheduled for August   Patient was instructed to keep this appointment

## 2022-05-18 ENCOUNTER — HOSPITAL ENCOUNTER (OUTPATIENT)
Dept: VASCULAR ULTRASOUND | Facility: HOSPITAL | Age: 62
Discharge: HOME | End: 2022-05-18
Payer: COMMERCIAL

## 2022-05-18 DIAGNOSIS — R42 DIZZINESS, NONSPECIFIC: ICD-10-CM

## 2022-05-18 DIAGNOSIS — R03.0 BORDERLINE HYPERTENSION: ICD-10-CM

## 2022-05-18 PROCEDURE — 93880 EXTRACRANIAL BILAT STUDY: CPT

## 2022-05-18 PROCEDURE — 93880 EXTRACRANIAL BILAT STUDY: CPT | Performed by: SURGERY

## 2022-05-23 ENCOUNTER — APPOINTMENT (OUTPATIENT)
Dept: LAB | Facility: CLINIC | Age: 62
End: 2022-05-23
Payer: COMMERCIAL

## 2022-05-23 DIAGNOSIS — R73.09 ELEVATED GLUCOSE: ICD-10-CM

## 2022-05-23 DIAGNOSIS — E05.90 HYPERTHYROIDISM: ICD-10-CM

## 2022-05-23 DIAGNOSIS — E78.2 MIXED HYPERLIPIDEMIA: ICD-10-CM

## 2022-05-23 LAB
CHOLEST SERPL-MCNC: 214 MG/DL
EST. AVERAGE GLUCOSE BLD GHB EST-MCNC: 128 MG/DL
HBA1C MFR BLD: 6.1 %
HDLC SERPL-MCNC: 70 MG/DL
LDLC SERPL CALC-MCNC: 132 MG/DL (ref 0–100)
T4 FREE SERPL-MCNC: 0.94 NG/DL (ref 0.76–1.46)
TRIGL SERPL-MCNC: 60 MG/DL
TSH SERPL DL<=0.05 MIU/L-ACNC: 0.41 UIU/ML (ref 0.45–4.5)

## 2022-05-23 PROCEDURE — 84443 ASSAY THYROID STIM HORMONE: CPT

## 2022-05-23 PROCEDURE — 36415 COLL VENOUS BLD VENIPUNCTURE: CPT

## 2022-05-23 PROCEDURE — 80061 LIPID PANEL: CPT

## 2022-05-23 PROCEDURE — 84439 ASSAY OF FREE THYROXINE: CPT

## 2022-05-23 PROCEDURE — 83036 HEMOGLOBIN GLYCOSYLATED A1C: CPT

## 2022-05-27 ENCOUNTER — OFFICE VISIT (OUTPATIENT)
Dept: INTERNAL MEDICINE CLINIC | Facility: CLINIC | Age: 62
End: 2022-05-27
Payer: COMMERCIAL

## 2022-05-27 VITALS
HEART RATE: 98 BPM | SYSTOLIC BLOOD PRESSURE: 118 MMHG | OXYGEN SATURATION: 96 % | RESPIRATION RATE: 20 BRPM | BODY MASS INDEX: 37.67 KG/M2 | WEIGHT: 240 LBS | TEMPERATURE: 97.6 F | HEIGHT: 67 IN | DIASTOLIC BLOOD PRESSURE: 74 MMHG

## 2022-05-27 DIAGNOSIS — R73.03 PREDIABETES: ICD-10-CM

## 2022-05-27 DIAGNOSIS — E05.90 HYPERTHYROIDISM: Primary | ICD-10-CM

## 2022-05-27 PROCEDURE — 99214 OFFICE O/P EST MOD 30 MIN: CPT

## 2022-05-27 PROCEDURE — 3725F SCREEN DEPRESSION PERFORMED: CPT

## 2022-05-27 PROCEDURE — 1036F TOBACCO NON-USER: CPT

## 2022-05-27 PROCEDURE — 3008F BODY MASS INDEX DOCD: CPT

## 2022-05-27 NOTE — PATIENT INSTRUCTIONS
Recheck Thyroid labs in 3-4 weeks  Schedule ultrasound and nuclear medicine test       Hyperthyroidism   AMBULATORY CARE:   Hyperthyroidism  is a condition that develops when your thyroid hormone levels are high  Thyroid hormones help control body temperature, heart rate, growth, and weight  Common signs and symptoms include the following: The signs and symptoms may develop slowly, sometimes over several years  Weight loss, increased appetite, diarrhea, or constipation    Increased sweating and heat intolerance    Nervousness, restlessness, tremors, and difficulty sleeping    Fast heart rate and fast breathing, even at rest    Painful lump in your neck or bulging eyes    Fatigue and muscle weakness    Decreased or absent monthly periods    Call 911 for any of the following: You have sudden chest pain or shortness of breath  You have a seizure  Your heart is beating faster than usual     You feel like you are going to faint  Contact your healthcare provider if:   You have a fever  You feel nervous and restless  You have chills, a cough, or feel weak and achy  You run out of medicine or have stopped taking it  You have questions or concerns about your condition or care  Treatment:  You may not need any treatment, or you may need any of the following:  Antithyroid medicines  decrease thyroid hormone levels and your symptoms  Radioactive iodine  is given to damage or kill some thyroid gland cells  This may decrease the amount of thyroid hormone produced  Tell your healthcare provider if you know or think you are pregnant  This medicine can be harmful to an unborn baby  Surgery  may be done to remove all or part of the thyroid gland  Follow up with your healthcare provider as directed: You may need to return for more blood tests to check your thyroid hormone level  This will show if you are getting the right amount of medicine   Do not stop taking your medicines without talking to your healthcare provider first  Write down your questions so you remember to ask them during your visits  © Copyright [employer] 2022 Information is for End User's use only and may not be sold, redistributed or otherwise used for commercial purposes  All illustrations and images included in CareNotes® are the copyrighted property of A D A M , Inc  or Pako Ceballos   The above information is an  only  It is not intended as medical advice for individual conditions or treatments  Talk to your doctor, nurse or pharmacist before following any medical regimen to see if it is safe and effective for you  Prediabetes   AMBULATORY CARE:   Prediabetes  is a blood glucose (sugar) level that is higher than normal  It is not high enough to be considered diabetes  Prediabetes increases your risk for type 2 diabetes and heart disease, especially if you have high blood pressure or high cholesterol  The following increase your risk for prediabetes:   Being overweight or obese, with a body mass index (BMI) of 25 or higher (23 or higher if you are  American)    Lack of physical activity    Older age    Family history of diabetes (parent or sibling)    A history of heart disease, gestational diabetes, or polycystic ovary syndrome (PCOS)    High blood pressure or cholesterol levels    Being [place] American, , , [place] American, or     In children, having a mother with diabetes or gestational diabetes mellitus (GDM) during the pregnancy    Signs and symptoms:  Prediabetes may not cause any symptoms  Call your doctor if:   You have more hunger or thirst than usual     You are urinating more often than usual     You are always exhausted  You have blurred vision  You have questions or concerns about your condition or care  Prevent or delay type 2 diabetes:  Healthy choices work best to delay or prevent type 2 diabetes   You may be given the following guidelines from your healthcare provider:  Get regular physical activity  Adults should get at least 150 minutes (2 5 hours) of moderate physical activity every week  Spread the amount of activity over at least 3 days a week  Do not skip more than 2 days in a row  Children should get at least 60 minutes of moderate physical activity on most days of the week  Examples of moderate physical activity include brisk walking, running, and swimming  Do not sit for longer than 30 minutes at a time  Work with your healthcare provider to create a plan for physical activity  Lose weight if you are overweight  A weight loss of 7% of your body weight can help  Your healthcare provider can tell you what weight is healthy for you  He or she can help you create a weight loss plan  Eat healthy foods  Eat a variety of fruits and vegetables  Eat whole-grain foods more often  Choose dairy foods, meat, and other protein foods that are low in fat  Eat fewer sweets, such as candy, cookies, regular soda, and sweetened drinks  You can also decrease calories by eating smaller portion sizes  Work with your healthcare provider or dietitian to develop a meal plan that is right for you  Take medicine as directed  Your healthcare provider may give diabetes medicine if you are at high risk for diabetes  You may also need medicines for high blood pressure and high cholesterol  Follow up with your healthcare provider as directed  You will need to return every year to get tested for diabetes  Do not smoke  Do not use e-cigarettes or smokeless tobacco in place of cigarettes or to help you quit  They still contain nicotine  Ask your healthcare provider for information if you currently smoke and need help quitting  Start with small goals and work your way up  You may need to start with 3 days of physical activity  You can add a day after 3 weeks or so of activity   Make a goal of losing 5 pounds at first  Swap a piece of fruit for dessert or sweets  Start with 2 fruits in a week and increase it weekly  These are suggestions  Talk with healthcare providers about making a plan that is right for you  Follow up with your doctor as directed: If you do have prediabetes, you will need to return every year to get tested for diabetes  Write down your questions so you remember to ask them during your visits  © Copyright [employer] 2022 Information is for End User's use only and may not be sold, redistributed or otherwise used for commercial purposes  All illustrations and images included in CareNotes® are the copyrighted property of [employer]  or [facility] Mark Ceballos   The above information is an  only  It is not intended as medical advice for individual conditions or treatments  Talk to your doctor, nurse or pharmacist before following any medical regimen to see if it is safe and effective for you

## 2022-05-27 NOTE — PROGRESS NOTES
[facility] - [facility] OF [facility]    NAME: Sarah Parent  AGE: 59 y o  SEX: female  : 1960     DATE: 2022     Assessment and Plan:     Problem List Items Addressed This Visit        Endocrine    Hyperthyroidism - Primary     Recent TSH is 0 409 free T4 is 0 9 4  She has an evaluation by Endocrinology in August   Will have her schedule a nuclear medicine uptake and ultrasound of thyroid  also repeat TSH in 4-6 weeks  Reviewed  signs and symptoms to monitor for  Will follow-up with her when results are back           Relevant Orders    US thyroid    NM thyroid imaging w uptake(s)    TSH, 3rd generation with Free T4 reflex    Thyroid Antibodies Panel       Other    Prediabetes     Patient's hemoglobin A1c is 6 1  Reviewed lifestyle modification including a low-carbohydrate healthy fat diet, limiting processed foods, increasing fiber, and increasing her exercise  Patient has recently started these things prior to the appointment reports a weight loss  Encouraged to continue these activities will repeat hemoglobin a1c in 3-6 months                     No follow-ups on file  Chief Complaint:     Chief Complaint   Patient presents with    Follow-up     Patient reports no new complaints         History of Present Illness:     Kenneth Comer presents to the office today for follow-up related to her hyperthyroidism, mild intermittent dizziness and review of recent studies offers no new complaints or concerns     Review of Systems:     Review of Systems   Constitutional: Negative  HENT: Negative  Eyes: Negative  Respiratory: Negative  Cardiovascular: Negative  Gastrointestinal: Negative  Endocrine: Negative  Genitourinary: Negative  Musculoskeletal: Negative  Skin: Negative  Allergic/Immunologic: Negative  Neurological: Positive for light-headedness (Occasional, brief)  Hematological: Negative  Psychiatric/Behavioral: Negative  Problem List:     Patient Active Problem List   Diagnosis    Borderline hypertension    Hyperthyroidism    Dizziness, nonspecific        Objective:     /74 (BP Location: Left arm, Patient Position: Sitting, Cuff Size: Large)   Pulse 98   Temp 97 6 °F (36 4 °C) (Tympanic)   Resp 20   Ht 5' 7" (1 702 m)   Wt 109 kg (240 lb)   SpO2 96%   BMI 37 59 kg/m²     Physical Exam  Constitutional:       General: She is not in acute distress  Appearance: She is obese  HENT:      Head: Normocephalic and atraumatic  Right Ear: External ear normal       Left Ear: External ear normal       Nose: Nose normal       Mouth/Throat:      Mouth: Mucous membranes are moist       Pharynx: Oropharynx is clear  Eyes:      Conjunctiva/sclera: Conjunctivae normal    Cardiovascular:      Rate and Rhythm: Normal rate and regular rhythm  Pulses: Normal pulses  Heart sounds: Normal heart sounds  No murmur heard  Pulmonary:      Effort: Pulmonary effort is normal       Breath sounds: Normal breath sounds  No wheezing  Musculoskeletal:         General: Normal range of motion  Cervical back: Neck supple  Skin:     General: Skin is warm and dry  Capillary Refill: Capillary refill takes less than 2 seconds  Neurological:      Mental Status: She is alert and oriented to person, place, and time  Psychiatric:         Mood and Affect: Mood normal          Behavior: Behavior normal          Thought Content: Thought content normal          Judgment: Judgment normal          I spent 15 minutes with this patient      [address] [place] [address]  [facility] OF [facility]

## 2022-05-31 PROBLEM — R73.03 PREDIABETES: Status: ACTIVE | Noted: 2022-05-31

## 2022-05-31 NOTE — ASSESSMENT & PLAN NOTE
Recent TSH is 0 409 free T4 is 0 9 4  She has an evaluation by Endocrinology in August   Will have her schedule a nuclear medicine uptake and ultrasound of thyroid  also repeat TSH in 4-6 weeks  Reviewed  signs and symptoms to monitor for    Will follow-up with her when results are back

## 2022-05-31 NOTE — ASSESSMENT & PLAN NOTE
Patient's hemoglobin A1c is 6 1  Reviewed lifestyle modification including a low-carbohydrate healthy fat diet, limiting processed foods, increasing fiber, and increasing her exercise  Patient has recently started these things prior to the appointment reports a weight loss    Encouraged to continue these activities will repeat hemoglobin a1c in 3-6 months

## 2022-06-08 ENCOUNTER — HOSPITAL ENCOUNTER (OUTPATIENT)
Dept: ULTRASOUND IMAGING | Facility: CLINIC | Age: 62
Discharge: HOME | End: 2022-06-08
Payer: COMMERCIAL

## 2022-06-08 DIAGNOSIS — E05.90 HYPERTHYROIDISM: ICD-10-CM

## 2022-06-08 PROCEDURE — 76536 US EXAM OF HEAD AND NECK: CPT

## 2022-06-14 ENCOUNTER — TELEPHONE (OUTPATIENT)
Dept: INTERNAL MEDICINE CLINIC | Facility: CLINIC | Age: 62
End: 2022-06-14

## 2022-06-14 DIAGNOSIS — E04.1 THYROID NODULE: Primary | ICD-10-CM

## 2022-06-21 NOTE — NURSING NOTE
Call placed to patient to discuss upcoming appointment at [facility] radiology department and complete consultation with patient  Patient is having thyroid biopsy utilizing US  guidance  Reviewed patient's allergies, no current anticoagulant medication present , also discussed the pre and post procedure expectations  Reminded patient of location and time expected for procedure, Patient expressed understanding by verbalizing and repeating instructions  My number was also supplied to patient to call if any further questions or concerns should arise pre or post procedure

## 2022-06-29 ENCOUNTER — HOSPITAL ENCOUNTER (OUTPATIENT)
Dept: RADIOLOGY | Facility: HOSPITAL | Age: 62
Discharge: HOME | End: 2022-06-29
Admitting: RADIOLOGY
Payer: COMMERCIAL

## 2022-06-29 DIAGNOSIS — E04.1 THYROID NODULE: ICD-10-CM

## 2022-06-29 PROCEDURE — 88172 CYTP DX EVAL FNA 1ST EA SITE: CPT | Performed by: PATHOLOGY

## 2022-06-29 PROCEDURE — 88173 CYTOPATH EVAL FNA REPORT: CPT | Performed by: PATHOLOGY

## 2022-06-29 PROCEDURE — 10005 FNA BX W/US GDN 1ST LES: CPT

## 2022-06-29 RX ORDER — LIDOCAINE HYDROCHLORIDE 10 MG/ML
5 INJECTION, SOLUTION EPIDURAL; INFILTRATION; INTRACAUDAL; PERINEURAL ONCE
Status: COMPLETED | OUTPATIENT
Start: 2022-06-29 | End: 2022-06-29

## 2022-06-29 RX ADMIN — LIDOCAINE HYDROCHLORIDE 5 ML: 10 INJECTION, SOLUTION EPIDURAL; INFILTRATION; INTRACAUDAL; PERINEURAL at 10:07

## 2022-07-05 ENCOUNTER — TELEPHONE (OUTPATIENT)
Dept: INTERNAL MEDICINE CLINIC | Facility: CLINIC | Age: 62
End: 2022-07-05

## 2022-07-05 NOTE — TELEPHONE ENCOUNTER
----- Message from Michael Youssef DO sent at 7/5/2022  9:29 AM EDT -----  Please notify pt that the thyroid biopsy was negative for cancer

## 2022-07-05 NOTE — TELEPHONE ENCOUNTER
----- Message from Tato Dailey DO sent at 7/5/2022  9:29 AM EDT -----  Please notify pt that the thyroid biopsy was negative for cancer

## 2022-07-19 ENCOUNTER — APPOINTMENT (OUTPATIENT)
Dept: LAB | Facility: CLINIC | Age: 62
End: 2022-07-19
Payer: COMMERCIAL

## 2022-07-19 DIAGNOSIS — E05.90 HYPERTHYROIDISM: ICD-10-CM

## 2022-07-19 LAB
T4 FREE SERPL-MCNC: 1.04 NG/DL (ref 0.76–1.46)
TSH SERPL DL<=0.05 MIU/L-ACNC: 0.26 UIU/ML (ref 0.45–4.5)

## 2022-07-19 PROCEDURE — 84439 ASSAY OF FREE THYROXINE: CPT

## 2022-07-19 PROCEDURE — 84443 ASSAY THYROID STIM HORMONE: CPT

## 2022-07-19 PROCEDURE — 36415 COLL VENOUS BLD VENIPUNCTURE: CPT

## 2022-07-19 PROCEDURE — 86376 MICROSOMAL ANTIBODY EACH: CPT

## 2022-07-19 PROCEDURE — 86800 THYROGLOBULIN ANTIBODY: CPT

## 2022-07-20 LAB
THYROGLOB AB SERPL-ACNC: <1 IU/ML (ref 0–0.9)
THYROPEROXIDASE AB SERPL-ACNC: <8 IU/ML (ref 0–34)

## 2022-08-18 ENCOUNTER — CONSULT (OUTPATIENT)
Dept: ENDOCRINOLOGY | Facility: CLINIC | Age: 62
End: 2022-08-18
Payer: COMMERCIAL

## 2022-08-18 VITALS
TEMPERATURE: 98 F | HEART RATE: 75 BPM | WEIGHT: 230 LBS | BODY MASS INDEX: 36.02 KG/M2 | SYSTOLIC BLOOD PRESSURE: 132 MMHG | DIASTOLIC BLOOD PRESSURE: 86 MMHG

## 2022-08-18 DIAGNOSIS — E05.90 HYPERTHYROIDISM: ICD-10-CM

## 2022-08-18 DIAGNOSIS — R73.03 PREDIABETES: ICD-10-CM

## 2022-08-18 DIAGNOSIS — E04.2 MULTIPLE THYROID NODULES: ICD-10-CM

## 2022-08-18 DIAGNOSIS — E05.90 SUBCLINICAL HYPERTHYROIDISM: Primary | ICD-10-CM

## 2022-08-18 PROCEDURE — 99204 OFFICE O/P NEW MOD 45 MIN: CPT | Performed by: INTERNAL MEDICINE

## 2022-08-18 NOTE — PROGRESS NOTES
Sophie Chatterjee 59 y o  female MRN: [id number]    Encounter: [id number]      Assessment/Plan     Assessment: This is a 59y o -year-old female with multinodular goiter    Plan:  Diagnoses and all orders for this visit:    Subclinical hyperthyroidism    Lab Results   Component Value Date    [id number] 0 257 (L) 07/19/2022    TSH is suppressed  Free T4 is normal  Patient is currently taking multivitamins with thyroid and supplementation, discussed to stop multivitamin with iodine, stay away from iodine containing foods such as seafood  Repeat thyroid blood work in 4 weeks,  Will also obtain radioactive iodine uptake scan, to assess if the thyroid nodules are hot/cold    -     NM thyroid imaging w uptake(s); Future  -     T4, free; Future  -     TSH, 3rd generation; Future  -     T3, free; Future  -     Thyroid stimulating immunoglobulin; Future  -     Thyrotropin receptor antibody; Future  -     T4, free; Future  -     TSH, 3rd generation; Future  -     T3, free; Future    Hyperthyroidism  Repeat thyroid blood work in 4 weeks after stopping MVI with iodine iodine supplementation  -     Ambulatory Referral to Endocrinology  -     T4, free; Future  -     TSH, 3rd generation; Future  -     T3, free; Future    Multiple thyroid nodules  Will need thyroid ultrasound in 1 year for follow-up    -     T4, free; Future  -     TSH, 3rd generation; Future  -     T3, free; Future    Prediabetes  Educated about lifestyle modifications, importance of weight loss, dietary modifications  Follow with PCP      CC:   Multinodular goiter    History of Present Illness     HPI:    Sophie Chatterjee is 27-year-old woman with medical history of obesity, subclinical hyperthyroidism, multiple thyroid nodules prediabetes is here for evaluation of subclinical hyperthyroidism  Patient has history of multiple thyroid nodules, left lower pole thyroid nodule measuring 4 x 2 7 x 3 4 cm left lower pole nodule, mildly suspicious    This nodule meet criteria for biopsy  Patient underwent thyroid ultrasound-guided biopsy of left lower pole nodule, and biopsy was benign  Was also found to have low TSH since May 2022, TSH was 0 4 in May 2022, TSH was 0 25 in July 2022 with free T4 normal 1 04  Thyroglobulin antibodies and microsomal antibodies were negative  Patient complains of weight loss of 10 lb in last 6 months  She denies palpitations, chest pain, tremulousness, hair loss, heat intolerance  She denies history of exposure to iodine load inform of contrast dye  She denies taking over-the-counter supplementation  She denies history of radiation to the neck/face or chest area    Wt Readings from Last 3 Encounters:   08/18/22 104 kg (230 lb)   05/27/22 109 kg (240 lb)   04/29/22 110 kg (243 lb)     Review of Systems   Constitutional: Positive for fatigue and unexpected weight change  Negative for activity change, diaphoresis and fever  HENT: Negative  Eyes: Negative for visual disturbance  Respiratory: Negative for cough, chest tightness and shortness of breath  Cardiovascular: Negative for chest pain, palpitations and leg swelling  Gastrointestinal: Negative for abdominal pain, blood in stool, constipation, diarrhea, nausea and vomiting  Endocrine: Negative for cold intolerance, heat intolerance, polydipsia, polyphagia and polyuria  Genitourinary: Negative for dysuria, enuresis, frequency and urgency  Musculoskeletal: Positive for arthralgias and myalgias  Skin: Negative for pallor, rash and wound  Allergic/Immunologic: Negative  Neurological: Negative for dizziness, tremors, weakness and numbness  Hematological: Negative  Psychiatric/Behavioral: Negative          Historical Information   Past Medical History:   Diagnosis Date    Asthma      Past Surgical History:   Procedure Laterality Date    US GUIDED THYROID BIOPSY  6/29/2022     Social History   Social History     Substance and Sexual Activity   Alcohol Use Yes    Alcohol/week: 2 0 standard drinks    Types: 2 Glasses of wine per week    Comment: on ocassion      Social History     Substance and Sexual Activity   Drug Use Never     Social History     Tobacco Use   Smoking Status Never Smoker   Smokeless Tobacco Never Used     Family History:   Family History   Problem Relation Age of Onset    Diabetes Mother     Hypertension Mother     Diabetes Father     Thyroid disease Sister     No Known Problems Maternal Grandmother     No Known Problems Maternal Grandfather     No Known Problems Paternal Grandmother     No Known Problems Paternal Grandfather     No Known Problems Paternal Aunt     Thyroid disease Brother     Thyroid disease Son        Meds/Allergies   Current Outpatient Medications   Medication Sig Dispense Refill    APPLE CIDER VINEGAR PO Take by mouth gummies      Ascorbic Acid (vitamin C) 100 MG tablet Take 100 mg by mouth daily gummies      cholecalciferol (VITAMIN D3) 400 units tablet Take 400 Units by mouth daily gummies      clotrimazole-betamethasone (LOTRISONE) 1-0 05 % cream Apply topically 2 (two) times a day (Patient taking differently: Apply topically 2 (two) times a day As needed) 30 g 0    fluticasone (FLONASE) 50 mcg/act nasal spray 2 sprays into each nostril as needed As needed       loratadine-pseudoephedrine (Claritin-D 12 Hour) 5-120 mg per tablet Take 1 tablet by mouth as needed        Multiple Vitamin (multivitamin) capsule Take 1 capsule by mouth daily       No current facility-administered medications for this visit  Allergies   Allergen Reactions    Fruit & Vegetable Daily [Fish Oil - Food Allergy] Itching     Itching in the mouth    Doxycycline Hives    Methimazole Rash       Objective   Vitals: Blood pressure 132/86, pulse 75, temperature 98 °F (36 7 °C), weight 104 kg (230 lb)  Physical Exam  Vitals reviewed  Constitutional:       General: She is not in acute distress  Appearance: She is well-developed   She is not diaphoretic  HENT:      Head: Normocephalic and atraumatic  Eyes:      General:         Right eye: No discharge  Left eye: No discharge  Conjunctiva/sclera: Conjunctivae normal    Neck:      Thyroid: No thyromegaly  Cardiovascular:      Rate and Rhythm: Normal rate and regular rhythm  Pulses: Normal pulses  Heart sounds: Normal heart sounds  No murmur heard  Pulmonary:      Effort: Pulmonary effort is normal  No respiratory distress  Breath sounds: Normal breath sounds  No wheezing  Abdominal:      General: Bowel sounds are normal  There is no distension  Palpations: Abdomen is soft  Tenderness: There is no abdominal tenderness  Musculoskeletal:         General: No tenderness or deformity  Normal range of motion  Cervical back: Normal range of motion and neck supple  Skin:     General: Skin is warm and dry  Findings: No erythema or rash  Neurological:      General: No focal deficit present  Mental Status: She is alert and oriented to person, place, and time  Motor: No abnormal muscle tone  Deep Tendon Reflexes: Reflexes are normal and symmetric  Reflexes normal    Psychiatric:         Mood and Affect: Mood normal          Behavior: Behavior normal          The history was obtained from the review of the chart, patient  Lab Results:   Lab Results   Component Value Date/Time    TSH 3RD GENERATON 0 257 (L) 07/19/2022 10:49 AM    TSH 3RD GENERATON 0 409 (L) 05/23/2022 08:56 AM    TSH 3RD GENERATON 0 554 04/15/2022 11:21 AM    Free T4 1 04 07/19/2022 10:49 AM    Free T4 0 94 05/23/2022 08:56 AM    Free T4 0 97 03/23/2022 01:22 PM       Imaging Studies:   Results for orders placed during the hospital encounter of 06/08/22    US thyroid    Impression  The following meet current ACR criteria for recommending ultrasound guided biopsy: The 4 0 x 2 7 x 3 4 cm left lower pole nodule  (Image number 34) (CRITERIA: TR 3, Mildly suspicious   FNA if >2 5 cm  Other nodules can be followed up in one year  Heterogeneous, hypervascular thyroid gland which can be seen with entities such as Graves' disease  Reference: ACR Thyroid Imaging, Reporting and Data System (TI-RADS): White Paper of the [employer]  J AM Martha Radiol 7770;70:195-838  (additional recommendations based on American Thyroid Association 2015 guidelines )    The study was marked in EPIC for significant notification  Workstation performed: [id number]      I have personally reviewed pertinent reports  Portions of the record may have been created with voice recognition software  Occasional wrong word or "sound a like" substitutions may have occurred due to the inherent limitations of voice recognition software  Read the chart carefully and recognize, using context, where substitutions have occurred

## 2022-09-07 ENCOUNTER — OFFICE VISIT (OUTPATIENT)
Dept: INTERNAL MEDICINE CLINIC | Facility: CLINIC | Age: 62
End: 2022-09-07
Payer: COMMERCIAL

## 2022-09-07 VITALS
HEART RATE: 80 BPM | BODY MASS INDEX: 35.56 KG/M2 | WEIGHT: 226.6 LBS | SYSTOLIC BLOOD PRESSURE: 128 MMHG | DIASTOLIC BLOOD PRESSURE: 74 MMHG | RESPIRATION RATE: 16 BRPM | HEIGHT: 67 IN

## 2022-09-07 DIAGNOSIS — R73.03 PREDIABETES: Primary | ICD-10-CM

## 2022-09-07 DIAGNOSIS — E05.90 SUBCLINICAL HYPERTHYROIDISM: ICD-10-CM

## 2022-09-07 DIAGNOSIS — R42 EPISODIC LIGHTHEADEDNESS: ICD-10-CM

## 2022-09-07 DIAGNOSIS — R03.0 BORDERLINE HYPERTENSION: ICD-10-CM

## 2022-09-07 PROCEDURE — 99214 OFFICE O/P EST MOD 30 MIN: CPT | Performed by: FAMILY MEDICINE

## 2022-09-07 NOTE — PROGRESS NOTES
FOLLOW-UP OFFICE VISIT  [facility] - [facility]    NAME: Fabián Solis  AGE: 59 y o  SEX: female  : 1960     DATE: 2022     Assessment and Plan:     Problem List Items Addressed This Visit        Other    Prediabetes - Primary    Relevant Orders    HEMOGLOBIN A1C W/ EAG ESTIMATION    RESOLVED: Borderline hypertension      Other Visit Diagnoses     Episodic lightheadedness        Subclinical hyperthyroidism            Episodic lightheadedness has dramatically improved with dietary changes and weight loss  BP is no longer in borderline hypertension range with dietary choices changes and weight loss  Dara Soulier She will continue to follow with Endocrinology for subclinical hyperthyroidism  BP A1c ordered for monitoring of prediabetes  Return in about 6 months (around 3/7/2023) for Annual physical      Chief Complaint:     Chief Complaint   Patient presents with    Follow-up     6 months  History of Present Illness:   Patient presents for follow-up  Since last visit she has established with Endocrinology  Subclinical hyperthyroidism thought to be secondary to iodine exposure multivitamin  Patient has stopped multivitamin since that visit  She does have her nuclear med study scheduled as well  Reviewed recent weight loss  Patient has been making small changes to her diet including cutting back on calories and decreasing salt  She has also increase her physical activity with Patrice Chi and the stretching exercises  Says since doing this her episodes of lightheadedness have decreased significantly  she has a denies any visual changes, palpitation or syncopal episodes  Review of Systems:     Review of Systems   Respiratory: Negative for shortness of breath  Cardiovascular: Negative for chest pain  Gastrointestinal: Negative for constipation and diarrhea  Genitourinary: Negative for difficulty urinating and frequency     Neurological: Positive for light-headedness (episodes have been decreasing; has been exercising and it helps  )  Negative for dizziness and syncope  Problem List:     Patient Active Problem List   Diagnosis    Hyperthyroidism    Prediabetes        Objective:     /74 (BP Location: Left arm, Patient Position: Sitting, Cuff Size: Standard)   Pulse 80   Resp 16   Ht 5' 7" (1 702 m)   Wt 103 kg (226 lb 9 6 oz)   BMI 35 49 kg/m²     Physical Exam  HENT:      Head: Normocephalic and atraumatic  Right Ear: External ear normal       Left Ear: External ear normal    Eyes:      Extraocular Movements: Extraocular movements intact  Conjunctiva/sclera: Conjunctivae normal       Pupils: Pupils are equal, round, and reactive to light  Cardiovascular:      Rate and Rhythm: Normal rate and regular rhythm  Heart sounds: No murmur heard  Pulmonary:      Effort: Pulmonary effort is normal       Breath sounds: Normal breath sounds  Abdominal:      General: Bowel sounds are normal       Palpations: Abdomen is soft  Musculoskeletal:      Right lower leg: No edema  Left lower leg: No edema  Neurological:      Mental Status: She is alert and oriented to person, place, and time        Gait: Gait normal    Psychiatric:         Mood and Affect: Mood normal          Behavior: Behavior normal          Pertinent Laboratory/Diagnostic Studies:    Laboratory Results: I have personally reviewed the pertinent laboratory results/reports     Chemistry Profile:   Results from Last 12 Months   Lab Units 02/24/22  1016   POTASSIUM mmol/L 4 9   CHLORIDE mmol/L 108   CO2 mmol/L 28   BUN mg/dL 16   CREATININE mg/dL 1 15   GLUCOSE FASTING mg/dL 112*   CALCIUM mg/dL 9 6   AST U/L 15   ALT U/L 20   ALK PHOS U/L 73   EGFR ml/min/1 73sq m 50     Endocrine Studies:   Results from Last 12 Months   Lab Units 07/19/22  1049 05/23/22  0856   HEMOGLOBIN A1C %  --  6 1*   TSH 3RD GENERATON uIU/mL 0 257* 0 409*   FREE T4 ng/dL 1 04 0 94   THYROID MICROSOMAL ANTIBODY IU/mL <8  --    THYROGLOBULIN AB IU/mL <1 0  --    TRIGLYCERIDES mg/dL  --  60   CHOLESTEROL mg/dL  --  214*   HDL mg/dL  --  70   LDL CALC mg/dL  --  132*       Radiology/Other Diagnostic Testing Results: I have personally reviewed pertinent reports          [facility]  9/7/2022 8:50 AM

## 2022-09-19 ENCOUNTER — TELEPHONE (OUTPATIENT)
Dept: ENDOCRINOLOGY | Facility: CLINIC | Age: 62
End: 2022-09-19

## 2022-09-19 ENCOUNTER — APPOINTMENT (OUTPATIENT)
Dept: LAB | Facility: CLINIC | Age: 62
End: 2022-09-19
Payer: COMMERCIAL

## 2022-09-19 DIAGNOSIS — E05.90 HYPERTHYROIDISM: ICD-10-CM

## 2022-09-19 DIAGNOSIS — E05.90 SUBCLINICAL HYPERTHYROIDISM: ICD-10-CM

## 2022-09-19 DIAGNOSIS — R73.03 PREDIABETES: ICD-10-CM

## 2022-09-19 DIAGNOSIS — E04.2 MULTIPLE THYROID NODULES: ICD-10-CM

## 2022-09-19 LAB
T3FREE SERPL-MCNC: 2.6 PG/ML (ref 2.3–4.2)
T4 FREE SERPL-MCNC: 0.99 NG/DL (ref 0.76–1.46)
TSH SERPL DL<=0.05 MIU/L-ACNC: 0.41 UIU/ML (ref 0.45–4.5)

## 2022-09-19 PROCEDURE — 83520 IMMUNOASSAY QUANT NOS NONAB: CPT

## 2022-09-19 PROCEDURE — 36415 COLL VENOUS BLD VENIPUNCTURE: CPT

## 2022-09-19 PROCEDURE — 84443 ASSAY THYROID STIM HORMONE: CPT

## 2022-09-19 PROCEDURE — 84445 ASSAY OF TSI GLOBULIN: CPT

## 2022-09-19 PROCEDURE — 83036 HEMOGLOBIN GLYCOSYLATED A1C: CPT

## 2022-09-19 PROCEDURE — 84439 ASSAY OF FREE THYROXINE: CPT

## 2022-09-19 PROCEDURE — 84481 FREE ASSAY (FT-3): CPT

## 2022-09-19 NOTE — TELEPHONE ENCOUNTER
pts NM thyroid imaging w/ uptake was not approved and needs a peer to peer  Case # [id number] call [phone]    Cpt 40344 at [place] # [phone]    They said to call anytime and you can talk to a provider      Uptake in on Sept 22

## 2022-09-20 ENCOUNTER — TELEPHONE (OUTPATIENT)
Dept: ENDOCRINOLOGY | Facility: CLINIC | Age: 62
End: 2022-09-20

## 2022-09-20 LAB
EST. AVERAGE GLUCOSE BLD GHB EST-MCNC: 131 MG/DL
HBA1C MFR BLD: 6.2 %

## 2022-09-20 NOTE — TELEPHONE ENCOUNTER
Pt left a message of reception line  Does she need to have the uptake scan tomorrow  ??  Please let her know

## 2022-09-21 ENCOUNTER — TELEPHONE (OUTPATIENT)
Dept: INTERNAL MEDICINE CLINIC | Facility: CLINIC | Age: 62
End: 2022-09-21

## 2022-09-21 LAB — TSH RECEP AB SER-ACNC: <1.1 IU/L (ref 0–1.75)

## 2022-09-22 LAB — TSI SER-ACNC: <0.1 IU/L (ref 0–0.55)

## 2022-11-22 ENCOUNTER — OFFICE VISIT (OUTPATIENT)
Dept: ENDOCRINOLOGY | Facility: CLINIC | Age: 62
End: 2022-11-22

## 2022-11-22 VITALS
DIASTOLIC BLOOD PRESSURE: 70 MMHG | BODY MASS INDEX: 36.1 KG/M2 | HEART RATE: 64 BPM | HEIGHT: 67 IN | TEMPERATURE: 97.9 F | WEIGHT: 230 LBS | SYSTOLIC BLOOD PRESSURE: 128 MMHG

## 2022-11-22 DIAGNOSIS — E04.2 MULTINODULAR GOITER: ICD-10-CM

## 2022-11-22 DIAGNOSIS — E05.90 HYPERTHYROIDISM: Primary | ICD-10-CM

## 2022-11-22 DIAGNOSIS — R73.03 PREDIABETES: ICD-10-CM

## 2022-11-22 NOTE — PROGRESS NOTES
Patient Progress Note    CC: hyperthyroidism, thyroid nodules, prediabetes     Referring Provider  No referring provider defined for this encounter  History of Present Illness:     Patient is a 40-year-old female here for follow-up of subclinical hyperthyroidism, thyroid nodules, prediabetes  She was noted to have low TSH since Feb 2022  Thyroid stimulating immunoglobulin and thyrotropin receptor antibody were both negative  Thyroid antibodies previously were negative  Her TSH did gradually improved to 0 409 and free T4 to 0 99  Free T3 was normal at 2 60  These labs were done in September 2022  She is not on thyroid medication  In the past she has tried methimazole but had a rash  She does have a history of thyroid nodules which is why a thyroid uptake and scan was ordered but it was not approved  No history of external radiation to head/neck/chest   No family history of thyroid cancer  No recent Iodine loading in form of medication, biotin or kelp supplements or radiological diagnostic studies  Most recent thyroid ultrasound results:  June 2022  FINDINGS:  Thyroid parenchyma is diffusely heterogeneous in echotexture and also hyperemic      Right lobe: 6 6 x 2 3 x 3 0 cm  Volume 22 1 mL  Left lobe:  6 9 x 3 4 x 3 6 cm  Volume 40 2 mL  Isthmus: 0 2  cm      Nodule #1  Image 10  RIGHT upper pole nodule measuring 1 7 x 0 9 x 1 3 cm  COMPOSITION:  2 points, solid or almost completely solid   ECHOGENICITY:  1 point, hyperechoic or isoechoic  SHAPE:  0 points, wider-than-tall  MARGIN: 0 points, smooth  ECHOGENIC FOCI:  0 points, none or large comet-tail artifacts  TI-RADS Classification: TR 3 (3 points), FNA if >2 5 cm  Follow if >1 5 cm      Nodule #2  Image 12  RIGHT lower pole nodule measuring 1 4 x 0 8 x 1 3 cm  COMPOSITION:  2 points, solid or almost completely solid   ECHOGENICITY:  2 points, hypoechoic  SHAPE:  0 points, wider-than-tall  MARGIN: 0 points, smooth    ECHOGENIC FOCI:  0 points, none or large comet-tail artifacts  TI-RADS Classification: TR 4 (4-6 points), FNA if > 1 5 cm  Follow if > 1cm      Nodule #3  Image 34  LEFT lower pole nodule measuring 4 0 x 2 7 x 3 4 cm  COMPOSITION:  2 points, solid or almost completely solid   ECHOGENICITY:  1 point, hyperechoic or isoechoic  SHAPE:  0 points, wider-than-tall  MARGIN: 0 points, smooth  ECHOGENIC FOCI:  0 points, none or large comet-tail artifacts  TI-RADS Classification: TR 3 (3 points), FNA if >2 5 cm  Follow if >1 5 cm      There are additional nodules of lesser size and/or TI-RADS score  These do not necessitate additional evaluation based on ACR criteria       IMPRESSION:     The following meet current ACR criteria for recommending ultrasound guided biopsy:      The 4 0 x 2 7 x 3 4 cm left lower pole nodule  (Image number 34) (CRITERIA: TR 3, Mildly suspicious  FNA if >2 5 cm       Other nodules can be followed up in one year      Heterogeneous, hypervascular thyroid gland which can be seen with entities such as Graves' disease  FNA of left lower pole nodule done in June 2022 was benign  Prediabetes:  A1c is elevated at 6 2%  She is not on medication for prediabetes  She does try to follow a healthy diet and exercises routinely as tolerated      Patient Active Problem List   Diagnosis   • Hyperthyroidism   • Prediabetes   • Multinodular goiter     Past Medical History:   Diagnosis Date   • Asthma       Past Surgical History:   Procedure Laterality Date   • US GUIDED THYROID BIOPSY  6/29/2022      Family History   Problem Relation Age of Onset   • Diabetes Mother    • Hypertension Mother    • Diabetes Father    • Thyroid disease Sister    • No Known Problems Maternal Grandmother    • No Known Problems Maternal Grandfather    • No Known Problems Paternal Grandmother    • No Known Problems Paternal Grandfather    • No Known Problems Paternal Aunt    • Thyroid disease Brother    • Thyroid disease Son Social History     Tobacco Use   • Smoking status: Never   • Smokeless tobacco: Never   Substance Use Topics   • Alcohol use: Yes     Alcohol/week: 2 0 standard drinks     Types: 2 Glasses of wine per week     Comment: on ocassion      Allergies   Allergen Reactions   • Fruit & Vegetable Daily [Fish Oil - Food Allergy] Itching     Itching in the mouth   • Doxycycline Hives   • Methimazole Rash     Current Outpatient Medications   Medication Sig Dispense Refill   • APPLE CIDER VINEGAR PO Take by mouth gummies     • Ascorbic Acid (vitamin C) 100 MG tablet Take 100 mg by mouth daily gummies     • cholecalciferol (VITAMIN D3) 400 units tablet Take 400 Units by mouth daily gummies     • clotrimazole-betamethasone (LOTRISONE) 1-0 05 % cream Apply topically 2 (two) times a day (Patient taking differently: Apply topically 2 (two) times a day As needed) 30 g 0   • fluticasone (FLONASE) 50 mcg/act nasal spray 2 sprays into each nostril as needed As needed      • loratadine-pseudoephedrine (Claritin-D 12 Hour) 5-120 mg per tablet Take 1 tablet by mouth as needed       • Multiple Vitamin (multivitamin) capsule Take 1 capsule by mouth daily (Patient not taking: Reported on 9/7/2022)       No current facility-administered medications for this visit  Review of Systems   Constitutional: Negative for activity change, appetite change, fatigue and unexpected weight change  HENT: Negative for trouble swallowing  Eyes: Negative for visual disturbance  Respiratory: Negative for shortness of breath  Cardiovascular: Negative for chest pain and palpitations  Gastrointestinal: Negative for constipation and diarrhea  Endocrine: Negative for cold intolerance and heat intolerance  Musculoskeletal: Negative  Skin: Negative  Neurological: Negative for tremors  Psychiatric/Behavioral: Negative  Physical Exam:  Body mass index is 36 02 kg/m²    /70   Pulse 64   Temp 97 9 °F (36 6 °C) (Skin)   Ht 5' 7" (1 702 m)   Wt 104 kg (230 lb)   BMI 36 02 kg/m²    Wt Readings from Last 3 Encounters:   11/22/22 104 kg (230 lb)   09/07/22 103 kg (226 lb 9 6 oz)   08/18/22 104 kg (230 lb)       Physical Exam  Vitals and nursing note reviewed  Constitutional:       Appearance: She is well-developed and well-nourished  HENT:      Head: Normocephalic  Eyes:      General: No scleral icterus  Extraocular Movements: EOM normal       Pupils: Pupils are equal, round, and reactive to light  Neck:      Thyroid: Thyromegaly present  Cardiovascular:      Rate and Rhythm: Normal rate and regular rhythm  Pulses:           Radial pulses are 2+ on the right side and 2+ on the left side  Heart sounds: No murmur heard  Pulmonary:      Effort: Pulmonary effort is normal  No respiratory distress  Breath sounds: Normal breath sounds  No wheezing  Musculoskeletal:      Cervical back: Neck supple  Skin:     General: Skin is warm and dry  Neurological:      Mental Status: She is alert  Deep Tendon Reflexes: Reflexes are normal and symmetric  Psychiatric:         Mood and Affect: Mood and affect normal        Patient's shoes and socks were not removed            Labs:   Lab Results   Component Value Date    HGBA1C 6 2 (H) 09/19/2022       Lab Results   Component Value Date    HDL 70 05/23/2022    TRIG 60 05/23/2022       Lab Results   Component Value Date    CALCIUM 9 6 02/24/2022    K 4 9 02/24/2022    CO2 28 02/24/2022     02/24/2022    BUN 16 02/24/2022    CREATININE 1 15 02/24/2022        eGFR   Date Value Ref Range Status   02/24/2022 50 ml/min/1 73sq m Final       Lab Results   Component Value Date    ALT 20 02/24/2022    AST 15 02/24/2022    ALKPHOS 73 02/24/2022       Lab Results   Component Value Date    [id number] 0 409 (L) 09/19/2022           Plan:    Diagnoses and all orders for this visit:    Hyperthyroidism  Last TFTs showed improvement: TSH improved to 0 409 and free T4 normal at 0 99   Free T3 was normal at 2 60  Not on thyroid medication  TSI and thyrotropin receptor antibody previously negative  Thyroid uptake and scan was ordered but not completed as TFTs were improving  She denies symptoms of hyperthyroidism but advised to call for signs of hyperthyroidism  Repeat TFTs next month and prior to next visit  -     T4, free; Future  -     T3, free; Future  -     TSH, 3rd generation; Future  -     T4, free; Future  -     T3, free; Future  -     TSH, 3rd generation; Future    Multinodular goiter  FNA of left lower pole nodule done in June 2022 was benign  Repeat US in June 2023  Call for any obstructive symptoms    Prediabetes  A1C 6 2%  Continue with healthy diet and routine exercise as tolerated  -     Hemoglobin A1C; Future  -     Basic metabolic panel; Future          Discussed with the patient and all questions fully answered  She will call me if any problems arise      Counseled patient on diagnostic results, prognosis, risk and benefit of treatment options, instruction for management, importance of treatment compliance, risk  factor reduction and impressions      Johanna Forrest PA-C

## 2022-12-29 ENCOUNTER — TELEPHONE (OUTPATIENT)
Dept: ENDOCRINOLOGY | Facility: CLINIC | Age: 62
End: 2022-12-29

## 2022-12-29 ENCOUNTER — APPOINTMENT (OUTPATIENT)
Dept: LAB | Facility: CLINIC | Age: 62
End: 2022-12-29

## 2022-12-29 DIAGNOSIS — E05.90 HYPERTHYROIDISM: ICD-10-CM

## 2022-12-29 LAB
T3FREE SERPL-MCNC: 2.87 PG/ML (ref 2.3–4.2)
T4 FREE SERPL-MCNC: 1.05 NG/DL (ref 0.76–1.46)
TSH SERPL DL<=0.05 MIU/L-ACNC: 0.26 UIU/ML (ref 0.45–4.5)

## 2022-12-29 NOTE — TELEPHONE ENCOUNTER
Yes, he needs to stop seafood, any iodine supplementation or multivitamins for 3 weeks, for thyroid uptake and scan    Eura Livers

## 2022-12-29 NOTE — TELEPHONE ENCOUNTER
----- Message from Lydia Vieyra MD sent at 12/29/2022  3:15 PM EST -----  Please call the patient regarding her abnormal result    Her TSH is low again, she will need thyroid uptake and scan, it is in the system (may have to get prior authorization or [id number] review, it was declined before)

## 2023-02-07 ENCOUNTER — TELEPHONE (OUTPATIENT)
Dept: ENDOCRINOLOGY | Facility: CLINIC | Age: 63
End: 2023-02-07

## 2023-02-07 NOTE — TELEPHONE ENCOUNTER
Called insurance spoke with Ritika Walters  CPT code 02799 approved [facility] # [id number] valid 2/7/23-3/24/23 case # [id number]

## 2023-02-15 ENCOUNTER — HOSPITAL ENCOUNTER (OUTPATIENT)
Dept: NUCLEAR MEDICINE | Facility: HOSPITAL | Age: 63
Discharge: HOME | End: 2023-02-15
Attending: INTERNAL MEDICINE

## 2023-02-15 DIAGNOSIS — E05.90 SUBCLINICAL HYPERTHYROIDISM: ICD-10-CM

## 2023-02-16 ENCOUNTER — TELEPHONE (OUTPATIENT)
Dept: ENDOCRINOLOGY | Facility: CLINIC | Age: 63
End: 2023-02-16

## 2023-02-16 ENCOUNTER — HOSPITAL ENCOUNTER (OUTPATIENT)
Dept: NUCLEAR MEDICINE | Facility: HOSPITAL | Age: 63
Discharge: HOME | End: 2023-02-16
Attending: INTERNAL MEDICINE

## 2023-02-16 NOTE — TELEPHONE ENCOUNTER
----- Message from Laury Sanchez MD sent at 2/16/2023  2:51 PM EST -----  Please call the patient regarding her nuclear scan report, thyroid uptake and scan showed normal thyroid uptake, she should follow-up as scheduled for her appointment

## 2023-03-06 ENCOUNTER — LAB (OUTPATIENT)
Dept: LAB | Facility: CLINIC | Age: 63
End: 2023-03-06

## 2023-03-06 DIAGNOSIS — E05.90 HYPERTHYROIDISM: ICD-10-CM

## 2023-03-06 DIAGNOSIS — R73.03 PREDIABETES: ICD-10-CM

## 2023-03-06 LAB
ANION GAP SERPL CALCULATED.3IONS-SCNC: 2 MMOL/L (ref 4–13)
BUN SERPL-MCNC: 18 MG/DL (ref 5–25)
CALCIUM SERPL-MCNC: 9.1 MG/DL (ref 8.3–10.1)
CHLORIDE SERPL-SCNC: 105 MMOL/L (ref 96–108)
CO2 SERPL-SCNC: 27 MMOL/L (ref 21–32)
CREAT SERPL-MCNC: 0.9 MG/DL (ref 0.6–1.3)
GFR SERPL CREATININE-BSD FRML MDRD: 67 ML/MIN/1.73SQ M
GLUCOSE P FAST SERPL-MCNC: 96 MG/DL (ref 65–99)
POTASSIUM SERPL-SCNC: 4.5 MMOL/L (ref 3.5–5.3)
SODIUM SERPL-SCNC: 134 MMOL/L (ref 135–147)
T3FREE SERPL-MCNC: 2.33 PG/ML (ref 2.3–4.2)
T4 FREE SERPL-MCNC: 1.05 NG/DL (ref 0.76–1.46)
TSH SERPL DL<=0.05 MIU/L-ACNC: 0.39 UIU/ML (ref 0.45–4.5)

## 2023-03-07 LAB
EST. AVERAGE GLUCOSE BLD GHB EST-MCNC: 128 MG/DL
HBA1C MFR BLD: 6.1 %

## 2023-03-09 ENCOUNTER — OFFICE VISIT (OUTPATIENT)
Dept: INTERNAL MEDICINE CLINIC | Facility: CLINIC | Age: 63
End: 2023-03-09

## 2023-03-09 VITALS
WEIGHT: 226.2 LBS | TEMPERATURE: 97.5 F | DIASTOLIC BLOOD PRESSURE: 78 MMHG | RESPIRATION RATE: 18 BRPM | BODY MASS INDEX: 35.43 KG/M2 | HEART RATE: 72 BPM | SYSTOLIC BLOOD PRESSURE: 130 MMHG | OXYGEN SATURATION: 98 %

## 2023-03-09 DIAGNOSIS — E05.90 HYPERTHYROIDISM: ICD-10-CM

## 2023-03-09 DIAGNOSIS — E78.2 MIXED HYPERLIPIDEMIA: ICD-10-CM

## 2023-03-09 DIAGNOSIS — R73.03 PREDIABETES: ICD-10-CM

## 2023-03-09 DIAGNOSIS — Z12.11 COLON CANCER SCREENING: ICD-10-CM

## 2023-03-09 DIAGNOSIS — E04.2 MULTINODULAR GOITER: ICD-10-CM

## 2023-03-09 DIAGNOSIS — Z12.31 BREAST CANCER SCREENING BY MAMMOGRAM: ICD-10-CM

## 2023-03-09 DIAGNOSIS — Z00.00 ANNUAL PHYSICAL EXAM: Primary | ICD-10-CM

## 2023-03-09 RX ORDER — COVID-19 MOLECULAR TEST ASSAY
KIT MISCELLANEOUS
COMMUNITY
Start: 2022-12-04

## 2023-03-09 RX ORDER — DEXAMETHASONE SODIUM PHOSPHATE 1 MG/ML
SOLUTION/ DROPS OPHTHALMIC
COMMUNITY
Start: 2023-03-07

## 2023-03-09 NOTE — PROGRESS NOTES
ADULT ANNUAL [address]     NAME: Mariana Zhao  AGE: 57 y o  SEX: female  : 1960     DATE: 3/9/2023     Assessment and Plan:     Problem List Items Addressed This Visit        Endocrine    Hyperthyroidism    Multinodular goiter- TSH continues to be suppressed with a normal free T4  NM uptake of the thyroid was benign  Will f/u with endocrine in a few weeks  Other    Prediabetes- diet controlled  Recent a1c is 6 1     Mixed hyperlipidemia- diet controlled  Relevant Orders    Lipid Panel with Direct LDL reflex   Other Visit Diagnoses     Annual physical exam    -  Primary    Breast cancer screening by mammogram        Relevant Orders    Mammo screening bilateral w 3d & cad    Colon cancer screening        Relevant Orders    Occult Blood, Fecal Immunochemical          Immunizations and preventive care screenings were discussed with patient today  Appropriate education was printed on patient's after visit summary  Counseling:    · Dental Health: discussed importance of regular tooth brushing, flossing, and dental visits  Return in about 1 year (around 3/9/2024) for Annual physical      Chief Complaint:     Chief Complaint   Patient presents with   • Annual Exam     Pt is coming in for her yearly physical      History of Present Illness:     Adult Annual Physical   Patient here for a comprehensive physical exam  The patient reports no problems  Diet and Physical Activity  · Diet/Nutrition: consuming 3-5 servings of fruits/vegetables daily  · Exercise: 3-4 times a week on average  Depression Screening  PHQ-2/9 Depression Screening    Little interest or pleasure in doing things: 0 - not at all  Feeling down, depressed, or hopeless: 0 - not at all  PHQ-2 Score: 0  PHQ-2 Interpretation: Negative depression screen       General Health  · Sleep: sleeps well     · Hearing: normal - bilateral   · Vision: goes for regular eye exams  Seen today  Eye dilated   · Dental: regular dental visits  /GYN Health  · Patient is: postmenopausal  · Last mammo- 2/2022   ·   · Last colon cancer screening - 2017 recall 10 years      Review of Systems:     Review of Systems   Constitutional: Negative for fever  Eyes: Positive for photophobia  Respiratory: Negative for shortness of breath  Cardiovascular: Negative for chest pain and leg swelling  Gastrointestinal: Negative for constipation and diarrhea  Neurological: Negative for dizziness and headaches  Past Medical History:     Past Medical History:   Diagnosis Date   • Asthma       Past Surgical History:     Past Surgical History:   Procedure Laterality Date   • US GUIDED THYROID BIOPSY  6/29/2022      Social History:     Social History     Socioeconomic History   • Marital status: Single     Spouse name: None   • Number of children: None   • Years of education: None   • Highest education level: None   Occupational History   • None   Tobacco Use   • Smoking status: Never   • Smokeless tobacco: Never   Vaping Use   • Vaping Use: Never used   Substance and Sexual Activity   • Alcohol use:  Yes     Alcohol/week: 2 0 standard drinks     Types: 2 Glasses of wine per week     Comment: on ocassion    • Drug use: Never   • Sexual activity: Not Currently   Other Topics Concern   • None   Social History Narrative   • None     Social Determinants of Health     Financial Resource Strain: Not on file   Food Insecurity: Not on file   Transportation Needs: Not on file   Physical Activity: Not on file   Stress: Not on file   Social Connections: Not on file   Intimate Partner Violence: Not on file   Housing Stability: Not on file      Family History:     Family History   Problem Relation Age of Onset   • Diabetes Mother    • Hypertension Mother    • Diabetes Father    • Thyroid disease Sister    • No Known Problems Maternal Grandmother    • No Known Problems Maternal Grandfather    • No Known Problems Paternal Grandmother    • No Known Problems Paternal Grandfather    • No Known Problems Paternal Aunt    • Thyroid disease Brother    • Thyroid disease Son       Current Medications:     Current Outpatient Medications   Medication Sig Dispense Refill   • APPLE CIDER VINEGAR PO Take by mouth gummies     • Ascorbic Acid (vitamin C) 100 MG tablet Take 100 mg by mouth daily gummies     • BinaxNOW COVID-19 Ag Home Test KIT TEST AS DIRECTED TODAY     • cholecalciferol (VITAMIN D3) 400 units tablet Take 400 Units by mouth daily gummies     • clotrimazole-betamethasone (LOTRISONE) 1-0 05 % cream Apply topically 2 (two) times a day (Patient taking differently: Apply topically 2 (two) times a day As needed) 30 g 0   • dexamethasone sodium phosphate 0 1 % ophthalmic solution instill 1 drop into left eye once daily     • fluticasone (FLONASE) 50 mcg/act nasal spray 2 sprays into each nostril as needed As needed      • loratadine-pseudoephedrine (Claritin-D 12 Hour) 5-120 mg per tablet Take 1 tablet by mouth as needed       • Multiple Vitamin (multivitamin) capsule Take 1 capsule by mouth daily       No current facility-administered medications for this visit  Allergies: Allergies   Allergen Reactions   • Prednisolone Itching     Eye drop When use for the first time eyes were very itching and red    • Fruit & Vegetable Daily [Fish Oil - Food Allergy] Itching     Itching in the mouth   • Doxycycline Hives   • Methimazole Rash      Physical Exam:     /78 (BP Location: Left arm, Patient Position: Sitting, Cuff Size: Standard)   Pulse 72   Temp 97 5 °F (36 4 °C) (Temporal)   Resp 18   Wt 103 kg (226 lb 3 2 oz)   SpO2 98%   BMI 35 43 kg/m²     Physical Exam  HENT:      Head: Normocephalic  Right Ear: External ear normal       Left Ear: External ear normal    Eyes:      Extraocular Movements: Extraocular movements intact        Conjunctiva/sclera: Conjunctivae normal       Pupils: Pupils are unequal       Right eye: Pupil is round, reactive and not sluggish  Left eye: Pupil is round and reactive  Comments: Left pupil dilated    Cardiovascular:      Rate and Rhythm: Normal rate and regular rhythm  Heart sounds: No murmur heard  Pulmonary:      Effort: Pulmonary effort is normal       Breath sounds: Normal breath sounds  Abdominal:      General: Bowel sounds are normal       Palpations: Abdomen is soft  Musculoskeletal:      Right lower leg: No edema  Left lower leg: No edema  Neurological:      Mental Status: She is alert and oriented to person, place, and time        Gait: Gait normal    Psychiatric:         Mood and Affect: Mood normal          Behavior: Behavior normal           [facility]

## 2023-04-20 ENCOUNTER — APPOINTMENT (OUTPATIENT)
Age: 63
End: 2023-04-20
Payer: COMMERCIAL

## 2023-04-20 DIAGNOSIS — Z12.11 COLON CANCER SCREENING: ICD-10-CM

## 2023-04-20 DIAGNOSIS — E78.2 MIXED HYPERLIPIDEMIA: ICD-10-CM

## 2023-04-20 LAB
CHOLEST SERPL-MCNC: 198 MG/DL
HDLC SERPL-MCNC: 70 MG/DL
HEMOCCULT STL QL IA: NEGATIVE
LDLC SERPL CALC-MCNC: 117 MG/DL (ref 0–100)
TRIGL SERPL-MCNC: 55 MG/DL

## 2023-04-20 PROCEDURE — 36415 COLL VENOUS BLD VENIPUNCTURE: CPT

## 2023-04-20 PROCEDURE — 80061 LIPID PANEL: CPT

## 2023-04-20 PROCEDURE — G0328 FECAL BLOOD SCRN IMMUNOASSAY: HCPCS

## 2023-06-30 ENCOUNTER — OFFICE VISIT (OUTPATIENT)
Age: 63
End: 2023-06-30
Payer: COMMERCIAL

## 2023-06-30 VITALS
TEMPERATURE: 97.3 F | DIASTOLIC BLOOD PRESSURE: 78 MMHG | HEART RATE: 86 BPM | RESPIRATION RATE: 18 BRPM | OXYGEN SATURATION: 99 % | WEIGHT: 223.6 LBS | BODY MASS INDEX: 35.02 KG/M2 | SYSTOLIC BLOOD PRESSURE: 124 MMHG
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
## 2023-06-30 PROCEDURE — 99213 OFFICE O/P EST LOW 20 MIN: CPT | Performed by: PHYSICIAN ASSISTANT

## 2023-06-30 PROCEDURE — 90471 IMMUNIZATION ADMIN: CPT | Performed by: PHYSICIAN ASSISTANT

## 2023-06-30 PROCEDURE — 90715 TDAP VACCINE 7 YRS/> IM: CPT

## 2023-06-30 RX ORDER — LORATADINE 10 MG/1
10 TABLET ORAL DAILY
Qty: 20 TABLET | Refills: 0 | Status: SHIPPED | OUTPATIENT
Start: 2023-06-30

## 2023-06-30 RX ORDER — FAMOTIDINE 20 MG/1
20 TABLET, FILM COATED ORAL DAILY
Qty: 7 TABLET | Refills: 0 | Status: SHIPPED | OUTPATIENT
Start: 2023-06-30 | End: 2023-07-07

## 2023-06-30 RX ORDER — PREDNISONE 20 MG/1
20 TABLET ORAL DAILY
Qty: 15 TABLET | Refills: 0 | Status: SHIPPED | OUTPATIENT
Start: 2023-06-30 | End: 2023-07-15

## 2023-06-30 NOTE — PROGRESS NOTES
[facility]        NAME: Christo Kruger is a 57 y o  female  : 1960    MRN: [id number]  DATE: 2023  TIME: 9:05 AM    Assessment and Plan   Bitten or stung by nonvenomous insect and other nonvenomous arthropods, initial encounter [W57  XXXA]  1  Bitten or stung by nonvenomous insect and other nonvenomous arthropods, initial encounter  predniSONE 20 mg tablet    famotidine (PEPCID) 20 mg tablet    loratadine (CLARITIN) 10 mg tablet    Tdap Vaccine greater than or equal to 6yo            Patient Instructions     Patient reported having taken Prednisone taper dose with no complications, allergic, or adverse reactions  Follow up with PCP in 3-5 days  Proceed to  ER if symptoms worsen  Chief Complaint     Chief Complaint   Patient presents with   • Insect Bite     Claims several stings from bees / wasps last night at 1900  Pruritic area of head just inside hairline and R forearm with redness  Also swelling of forehead         History of Present Illness       56 yo female reported being stung yesterday by either bees or Wasp, not sure  She took a Zyrtec D last night which did helped  Believes she was stung on the forehead and right elbow region  Review of Systems   Review of Systems   Constitutional: Negative for activity change, appetite change, fatigue and fever  HENT: Positive for facial swelling  Negative for congestion, postnasal drip, sneezing, sore throat, trouble swallowing and voice change  Eyes: Negative for visual disturbance  Respiratory: Negative for cough, chest tightness, shortness of breath, wheezing and stridor  Cardiovascular: Negative for chest pain and palpitations  Gastrointestinal: Negative for abdominal pain and nausea  Skin: Positive for rash  Negative for color change  Neurological: Negative for dizziness, weakness, light-headedness and headaches           Current Medications       Current Outpatient Medications:   •  APPLE CIDER VINEGAR PO, Take by mouth gummies, Disp: , Rfl:   •  Ascorbic Acid (vitamin C) 100 MG tablet, Take 100 mg by mouth daily gummies, Disp: , Rfl:   •  cholecalciferol (VITAMIN D3) 400 units tablet, Take 400 Units by mouth daily gummies, Disp: , Rfl:   •  famotidine (PEPCID) 20 mg tablet, Take 1 tablet (20 mg total) by mouth daily for 7 days, Disp: 7 tablet, Rfl: 0  •  fluticasone (FLONASE) 50 mcg/act nasal spray, 2 sprays into each nostril as needed As needed , Disp: , Rfl:   •  loratadine (CLARITIN) 10 mg tablet, Take 1 tablet (10 mg total) by mouth daily, Disp: 20 tablet, Rfl: 0  •  loratadine-pseudoephedrine (Claritin-D 12 Hour) 5-120 mg per tablet, Take 1 tablet by mouth as needed  , Disp: , Rfl:   •  Multiple Vitamin (multivitamin) capsule, Take 1 capsule by mouth daily, Disp: , Rfl:   •  predniSONE 20 mg tablet, Take 1 tablet (20 mg total) by mouth daily for 15 days 3 tabs by mouth daily for 5 days  , Disp: 15 tablet, Rfl: 0  •  BinaxNOW COVID-19 Ag Home Test KIT, TEST AS DIRECTED TODAY (Patient not taking: Reported on 4/13/2023), Disp: , Rfl:   •  clotrimazole-betamethasone (LOTRISONE) 1-0 05 % cream, Apply topically 2 (two) times a day (Patient not taking: Reported on 6/30/2023), Disp: 30 g, Rfl: 0  •  dexamethasone sodium phosphate 0 1 % ophthalmic solution, instill 1 drop into left eye once daily (Patient not taking: Reported on 6/30/2023), Disp: , Rfl:     Current Allergies     Allergies as of 06/30/2023 - Reviewed 06/30/2023   Allergen Reaction Noted   • Prednisolone Itching 03/09/2023   • Fruit & vegetable daily [fish oil - food allergy] Itching 02/11/2022   • Doxycycline Hives 01/24/2017   • Methimazole Rash 04/29/2022            The following portions of the patient's history were reviewed and updated as appropriate: allergies, current medications, past family history, past medical history, past social history, past surgical history and problem list      Past Medical History:   Diagnosis Date   • Asthma        Past Surgical History:   Procedure Laterality Date   • US GUIDED THYROID BIOPSY  6/29/2022       Family History   Problem Relation Age of Onset   • Diabetes Mother    • Hypertension Mother    • Diabetes Father    • Thyroid disease Sister    • No Known Problems Maternal Grandmother    • No Known Problems Maternal Grandfather    • No Known Problems Paternal Grandmother    • No Known Problems Paternal Grandfather    • No Known Problems Paternal Aunt    • Thyroid disease Brother    • Thyroid disease Son          Medications have been verified  Objective   /78 (BP Location: Left arm, Patient Position: Sitting, Cuff Size: Adult)   Pulse 86   Temp (!) 97 3 °F (36 3 °C) (Tympanic)   Resp 18   Wt 101 kg (223 lb 9 6 oz)   SpO2 99%   BMI 35 02 kg/m²        Physical Exam     Physical Exam  Vitals and nursing note reviewed  Constitutional:       General: She is not in acute distress  Appearance: Normal appearance  She is not ill-appearing, toxic-appearing or diaphoretic  HENT:      Head: Normocephalic and atraumatic  Nose: Nose normal       Mouth/Throat:      Mouth: Mucous membranes are moist       Pharynx: Oropharynx is clear  Eyes:      General: No scleral icterus  Extraocular Movements: Extraocular movements intact  Conjunctiva/sclera: Conjunctivae normal       Pupils: Pupils are equal, round, and reactive to light  Cardiovascular:      Rate and Rhythm: Normal rate and regular rhythm  Pulses: Normal pulses  Pulmonary:      Effort: Pulmonary effort is normal  No respiratory distress  Breath sounds: Normal breath sounds  No stridor  No wheezing, rhonchi or rales  Chest:      Chest wall: No tenderness  Musculoskeletal:         General: Normal range of motion  Cervical back: Normal range of motion and neck supple  No tenderness  Lymphadenopathy:      Cervical: No cervical adenopathy     Skin:     Comments: Swelling, erythematous, and inflammation noted over the forehead and right elbow region  Non-tender, warm, no signs of stingers  Neurological:      General: No focal deficit present  Mental Status: She is alert and oriented to person, place, and time  Coordination: Coordination normal       Gait: Gait normal    Psychiatric:         Mood and Affect: Mood normal          Behavior: Behavior normal          Thought Content:  Thought content normal          Judgment: Judgment normal

## 2023-06-30 NOTE — PATIENT INSTRUCTIONS
Insect Bite or Sting   WHAT YOU NEED TO KNOW:   Most insect bites and stings are not dangerous and go away without treatment  Your symptoms may be mild, or you may develop anaphylaxis  Anaphylaxis is a sudden, life-threatening reaction that needs immediate treatment  Common examples of insects that bite or sting are bees, ticks, mosquitoes, spiders, and ants  Insect bites or stings can lead to diseases such as malaria, West Nile virus, Lyme disease, or [place] Spotted Fever  DISCHARGE INSTRUCTIONS:   Call your local emergency number (911 in the [address] [facility]) for signs or symptoms of anaphylaxis,  such as trouble breathing, swelling in your mouth or throat, or wheezing  You may also have itching, a rash, hives, or feel like you are going to faint  Return to the emergency department if:   You are stung on your tongue or in your throat  A white area forms around the bite  You are sweating badly or have body pain  You think you were bitten or stung by a poisonous insect  Call your doctor if:   You have a fever  The area becomes red, warm, tender, and swollen beyond the area of the bite or sting  You have questions or concerns about your condition or care  Medicines: You may need any of the following:  Antihistamines  decrease itching and rash  Epinephrine  is used to treat severe allergic reactions such as anaphylaxis  Take your medicine as directed  Contact your healthcare provider if you think your medicine is not helping or if you have side effects  Tell your provider if you are allergic to any medicine  Keep a list of the medicines, vitamins, and herbs you take  Include the amounts, and when and why you take them  Bring the list or the pill bottles to follow-up visits  Carry your medicine list with you in case of an emergency  Steps to take for signs or symptoms of anaphylaxis:   Immediately  give 1 shot of epinephrine only into the outer thigh muscle           Leave the shot in place as directed  Your healthcare provider may recommend you leave it in place for up to 10 seconds before you remove it  This helps make sure all of the epinephrine is delivered  Call 911 and go to the emergency department,  even if the shot improved symptoms  Do not drive yourself  Bring the used epinephrine shot with you  Safety precautions to take if you are at risk for anaphylaxis:   Keep 2 shots of epinephrine with you at all times  You may need a second shot, because epinephrine only works for about 20 minutes and symptoms may return  Your healthcare provider can show you and family members how to give the shot  Check the expiration date every month and replace it before it expires  Create an action plan  Your healthcare provider can help you create a written plan that explains the allergy and an emergency plan to treat a reaction  The plan explains when to give a second epinephrine shot if symptoms return or do not improve after the first  Give copies of the action plan and emergency instructions to family members, work and school staff, and  providers  Show them how to give a shot of epinephrine  Carry medical alert identification  Wear medical alert jewelry or carry a card that says you have an insect allergy  Ask your healthcare provider where to get these items  If an insect bites or stings you:   Remove the stinger  Scrape the stinger out with your fingernail, edge of a credit card, or a knife blade  Do not squeeze the wound  Gently wash the area with soap and water  Remove the tick  Ticks must be removed as soon as possible so you do not get diseases passed through tick bites  Ask your healthcare provider for more information on tick bites and how to remove ticks  Care for a bite or sting wound:   Elevate (raise) the area above the level of your heart, if possible  Prop the area on pillows to keep it raised comfortably   Elevate the area for 10 to 20 minutes each hour or as directed by your healthcare provider  Use compresses  Soak a clean washcloth in cold water, wring it out, and put it on the bite or sting  Use the compress for 10 to 20 minutes each hour or as directed by your healthcare provider  After 24 to 48 hours, change to warm compresses  Apply a paste  Add water to baking soda to make a thick paste  Put the paste on the area for 5 minutes  Rinse gently to remove the paste  Prevent another insect bite or sting:   Do not wear bright-colored or flower-print clothing when you plan to spend time outdoors  Do not use hairspray, perfumes, or aftershave  Do not leave food out  Empty any standing water and wash container with soap and water every 2 days  Put screens on all open windows and doors  Put insect repellent that contains DEET on skin that is showing when you go outside  Put insect repellent at the top of your boots, bottom of pant legs, and sleeve cuffs  Wear long sleeves, pants, and shoes  Use citronella candles outdoors to help keep mosquitoes away  Put a tick and flea collar on pets  Follow up with your doctor as directed:  Write down your questions so you remember to ask them during your visits  © Copyright [employer] 2022 Information is for End User's use only and may not be sold, redistributed or otherwise used for commercial purposes  The above information is an  only  It is not intended as medical advice for individual conditions or treatments  Talk to your doctor, nurse or pharmacist before following any medical regimen to see if it is safe and effective for you

## 2023-07-17 ENCOUNTER — HOSPITAL ENCOUNTER (OUTPATIENT)
Dept: ULTRASOUND IMAGING | Facility: CLINIC | Age: 63
Discharge: HOME | End: 2023-07-17
Payer: COMMERCIAL

## 2023-07-17 DIAGNOSIS — E04.2 MULTINODULAR GOITER: ICD-10-CM

## 2023-07-17 PROCEDURE — 76536 US EXAM OF HEAD AND NECK: CPT

## 2023-08-23 ENCOUNTER — OFFICE VISIT (OUTPATIENT)
Age: 63
End: 2023-08-23
Payer: COMMERCIAL

## 2023-08-23 VITALS
SYSTOLIC BLOOD PRESSURE: 136 MMHG | RESPIRATION RATE: 19 BRPM | BODY MASS INDEX: 34.74 KG/M2 | HEART RATE: 72 BPM | DIASTOLIC BLOOD PRESSURE: 80 MMHG | TEMPERATURE: 97.6 F | WEIGHT: 221.8 LBS | OXYGEN SATURATION: 100 %

## 2023-08-23 DIAGNOSIS — U07.1 LAB TEST POSITIVE FOR DETECTION OF COVID-19 VIRUS: Primary | ICD-10-CM

## 2023-08-23 DIAGNOSIS — J06.9 ACUTE URI: ICD-10-CM

## 2023-08-23 LAB
SARS-COV-2 AG UPPER RESP QL IA: POSITIVE
VALID CONTROL: ABNORMAL

## 2023-08-23 PROCEDURE — 99213 OFFICE O/P EST LOW 20 MIN: CPT | Performed by: PHYSICIAN ASSISTANT

## 2023-08-23 PROCEDURE — 87811 SARS-COV-2 COVID19 W/OPTIC: CPT | Performed by: PHYSICIAN ASSISTANT

## 2023-08-23 NOTE — PROGRESS NOTES
[facility]        NAME: Sudhir Rich is a 57 y.o. female  : 1960    MRN: [id number]  DATE: 2023  TIME: 9:53 AM    Assessment and Plan   Lab test positive for detection of COVID-19 virus [U07.1]  1. Lab test positive for detection of COVID-19 virus  Poct Covid 19 Rapid Antigen Test      2. Acute URI  Poct Covid 19 Rapid Antigen Test            Patient Instructions     As in your home COVID test today's test was also positive. As per the CDC you are isolated for 5 days in 2 days your 6-day. As per the CDC you may stop isolation and wear a well fitted mask for additional 4 days which then after you may remove your mask. Follow up with PCP in 3-5 days. Proceed to  ER if symptoms worsen. Chief Complaint     Chief Complaint   Patient presents with   • Cold Like Symptoms     Pt states last Friday she started with a cough, runny nose, and slight fever. Pt states  she had loss of appetite. Yesterday tested positive for covid 23. Pt also struggles with allergies and was taking cold medicine to help. History of Present Illness       Patient is a pleasant 24-year-old female is presenting today reporting upper respiratory Friday, 2023. Symptoms associated with cough head congestion with rhinorrhea, and generalized days. Patient denies shortness of breath, dyspnea, wheezing, hemoptysis, sputum, tachypnea, dizziness, lightheadedness, weakness, fatigue, rash. He decided to test twice for COVID at home and was positive. Review of Systems   Review of Systems   Constitutional: Negative for activity change, appetite change, chills, diaphoresis, fatigue and fever. HENT: Positive for congestion and rhinorrhea. Negative for sore throat. Respiratory: Negative for chest tightness. Cardiovascular: Negative for chest pain and palpitations. Gastrointestinal: Negative for abdominal pain and nausea. Musculoskeletal: Negative for back pain.    Skin: Negative for pallor. Neurological: Negative for headaches.          Current Medications       Current Outpatient Medications:   •  APPLE CIDER VINEGAR PO, Take by mouth gummies, Disp: , Rfl:   •  Ascorbic Acid (vitamin C) 100 MG tablet, Take 100 mg by mouth daily gummies, Disp: , Rfl:   •  cholecalciferol (VITAMIN D3) 400 units tablet, Take 400 Units by mouth daily gummies, Disp: , Rfl:   •  fluticasone (FLONASE) 50 mcg/act nasal spray, 2 sprays into each nostril as needed As needed , Disp: , Rfl:   •  loratadine-pseudoephedrine (Claritin-D 12 Hour) 5-120 mg per tablet, Take 1 tablet by mouth as needed  , Disp: , Rfl:   •  Multiple Vitamin (multivitamin) capsule, Take 1 capsule by mouth daily, Disp: , Rfl:   •  BinaxNOW COVID-19 Ag Home Test KIT, TEST AS DIRECTED TODAY (Patient not taking: Reported on 4/13/2023), Disp: , Rfl:   •  clotrimazole-betamethasone (LOTRISONE) 1-0.05 % cream, Apply topically 2 (two) times a day (Patient not taking: Reported on 6/30/2023), Disp: 30 g, Rfl: 0  •  dexamethasone sodium phosphate 0.1 % ophthalmic solution, instill 1 drop into left eye once daily (Patient not taking: Reported on 6/30/2023), Disp: , Rfl:   •  famotidine (PEPCID) 20 mg tablet, Take 1 tablet (20 mg total) by mouth daily for 7 days, Disp: 7 tablet, Rfl: 0  •  loratadine (CLARITIN) 10 mg tablet, Take 1 tablet (10 mg total) by mouth daily (Patient not taking: Reported on 8/23/2023), Disp: 20 tablet, Rfl: 0    Current Allergies     Allergies as of 08/23/2023 - Reviewed 08/23/2023   Allergen Reaction Noted   • Prednisolone Itching 03/09/2023   • Fruit & vegetable daily [fish oil - food allergy] Itching 02/11/2022   • Doxycycline Hives 01/24/2017   • Methimazole Rash 04/29/2022   • Prednisone Rash 08/23/2023            The following portions of the patient's history were reviewed and updated as appropriate: allergies, current medications, past family history, past medical history, past social history, past surgical history and problem list.     Past Medical History:   Diagnosis Date   • Asthma        Past Surgical History:   Procedure Laterality Date   • US GUIDED THYROID BIOPSY  6/29/2022       Family History   Problem Relation Age of Onset   • Diabetes Mother    • Hypertension Mother    • Diabetes Father    • Thyroid disease Sister    • No Known Problems Maternal Grandmother    • No Known Problems Maternal Grandfather    • No Known Problems Paternal Grandmother    • No Known Problems Paternal Grandfather    • No Known Problems Paternal Aunt    • Thyroid disease Brother    • Thyroid disease Son          Medications have been verified. Objective   /80   Pulse 72   Temp 97.6 °F (36.4 °C)   Resp 19   Wt 101 kg (221 lb 12.8 oz)   SpO2 100%   BMI 34.74 kg/m²        Physical Exam     Physical Exam  Vitals and nursing note reviewed. Constitutional:       General: She is not in acute distress. Appearance: Normal appearance. She is not ill-appearing. HENT:      Right Ear: Tympanic membrane, ear canal and external ear normal.      Left Ear: Tympanic membrane, ear canal and external ear normal.      Nose: Congestion and rhinorrhea present. Mouth/Throat:      Mouth: Mucous membranes are moist.      Pharynx: Oropharynx is clear. Eyes:      General: No scleral icterus. Extraocular Movements: Extraocular movements intact. Conjunctiva/sclera: Conjunctivae normal.      Pupils: Pupils are equal, round, and reactive to light. Cardiovascular:      Rate and Rhythm: Normal rate and regular rhythm. Pulmonary:      Effort: Pulmonary effort is normal. No respiratory distress. Breath sounds: Normal breath sounds. Musculoskeletal:         General: Normal range of motion. Cervical back: Normal range of motion and neck supple. No tenderness. Lymphadenopathy:      Cervical: No cervical adenopathy. Skin:     General: Skin is warm and dry. Neurological:      General: No focal deficit present.       Mental Status: She is alert and oriented to person, place, and time.       Coordination: Coordination normal.      Gait: Gait normal.   Psychiatric:         Mood and Affect: Mood normal.         Behavior: Behavior normal.

## 2023-08-23 NOTE — PATIENT INSTRUCTIONS
COVID-19 (Coronavirus Disease 2019)   WHAT YOU NEED TO KNOW:   COVID-19 is the disease caused by a coronavirus first discovered in December 2019. Coronaviruses generally cause upper respiratory (nose, throat, and lung) infections, such as a cold. The 2019 virus spreads quickly and easily. It can be spread starting 2 to 3 days before symptoms even begin. DISCHARGE INSTRUCTIONS:   Call your local emergency number (911 in the [address]) if:   You have trouble breathing or shortness of breath at rest.    You have chest pain or pressure that lasts longer than 5 minutes. You become confused or hard to wake. Your lips or face are blue. Return to the emergency department if:   You have a fever of 104°F (40°C) or higher. Call your doctor if:   You have symptoms of COVID-19. You have questions or concerns about your condition or care. Medicines: You may need any of the following:  Decongestants  help reduce nasal congestion and help you breathe more easily. If you take decongestant pills, they may make you feel restless or cause problems with your sleep. Do not use decongestant sprays for more than a few days. Cough suppressants  help reduce coughing. Ask your healthcare provider which type of cough medicine is best for you. Acetaminophen  decreases pain and fever. It is available without a doctor's order. Ask how much to take and how often to take it. Follow directions. Read the labels of all other medicines you are using to see if they also contain acetaminophen, or ask your doctor or pharmacist. Acetaminophen can cause liver damage if not taken correctly. NSAIDs , such as ibuprofen, help decrease swelling, pain, and fever. This medicine is available with or without a doctor's order. NSAIDs can cause stomach bleeding or kidney problems in certain people. If you take blood thinner medicine, always ask your healthcare provider if NSAIDs are safe for you.  Always read the medicine label and follow directions. Blood thinners  help prevent blood clots. Clots can cause strokes, heart attacks, and death. The following are general safety guidelines to follow while you are taking a blood thinner:    Watch for bleeding and bruising while you take blood thinners. Watch for bleeding from your gums or nose. Watch for blood in your urine and bowel movements. Use a soft washcloth on your skin, and a soft toothbrush to brush your teeth. This can keep your skin and gums from bleeding. If you shave, use an electric shaver. Do not play contact sports. Tell your dentist and other healthcare providers that you take a blood thinner. Wear a bracelet or necklace that says you take this medicine. Do not start or stop any other medicines unless your healthcare provider tells you to. Many medicines cannot be used with blood thinners. Take your blood thinner exactly as prescribed by your healthcare provider. Do not skip does or take less than prescribed. Tell your provider right away if you forget to take your blood thinner, or if you take too much. Warfarin  is a blood thinner that you may need to take. The following are things you should be aware of if you take warfarin:     Foods and medicines can affect the amount of warfarin in your blood. Do not make major changes to your diet while you take warfarin. Warfarin works best when you eat about the same amount of vitamin K every day. Vitamin K is found in green leafy vegetables and certain other foods. Ask for more information about what to eat when you are taking warfarin. You will need to see your healthcare provider for follow-up visits when you are on warfarin. You will need regular blood tests. These tests are used to decide how much medicine you need. Take your medicine as directed. Contact your healthcare provider if you think your medicine is not helping or if you have side effects. Tell your provider if you are allergic to any medicine.  Keep a list of the medicines, vitamins, and herbs you take. Include the amounts, and when and why you take them. Bring the list or the pill bottles to follow-up visits. Carry your medicine list with you in case of an emergency. What you need to know about variants: The virus has changed into several new forms (called variants) since it was discovered. The variants may be more contagious (easily spread) than the original form. Some may also cause more severe illness than others. What you need to know about COVID-19 vaccines:  Healthcare providers recommend a COVID-19 vaccine, even if you have already had COVID-19. You are considered fully vaccinated against COVID-19 two weeks after the final dose of any COVID-19 vaccine. Let your healthcare provider know when you have received the final dose of the vaccine. Make a copy of your vaccination card. Keep the original with you in case you need to show it. Keep the copy in a safe place. Get a COVID-19 vaccine as directed. Vaccination is recommended for everyone 6 months or older. COVID-19 vaccines are given as a shot in 1 to 3 doses as a primary series. This depends on the vaccine brand and the age of the person who receives it. A booster dose is recommended for everyone 5 years or older after the primary series is complete. A second booster  is recommended for all adults 48 or older and for immunocompromised adolescents. The second booster is also recommended for anyone who got the 1-dose brand of vaccine for the first dose and a booster. Your provider can give you more information on boosters and help you schedule all needed doses. Continue social distancing and other measures. You can become infected even after you get the vaccine. You may also be able to pass the virus to others without knowing you are infected. After you get the vaccine, check local, national, and international travel rules. You may need to be tested before you travel.  Some countries require proof of a negative test before you travel. You may also need to quarantine after you return. Medicine may be given to prevent infection. The medicine can be given if you are at high risk for infection and cannot get the vaccine. It can also be given if your immune system does not respond well to the vaccine. How the 2019 coronavirus spreads:   Droplets are the main way all coronaviruses spread. The virus travels in droplets that form when a person talks, sings, coughs, or sneezes. The droplets can also float in the air for minutes or hours. Infection happens when you breathe in the droplets or get them in your eyes or nose. Close personal contact with an infected person increases your risk for infection. This means being within 6 feet (2 meters) of the person for at least 15 minutes over 24 hours. Person-to-person contact can spread the virus. For example, a person with the virus on his or her hands can spread it by shaking hands with someone. The virus can stay on objects and surfaces for up to 3 days. You may become infected by touching the object or surface and then touching your eyes or mouth. Help lower the risk for COVID-19:   Wash your hands often throughout the day. Use soap and water. Rub your soapy hands together, lacing your fingers, for at least 20 seconds. Rinse with warm, running water. Dry your hands with a clean towel or paper towel. Use hand  that contains alcohol if soap and water are not available. Teach children how to wash their hands and use hand . Cover sneezes and coughs. Turn your face away and cover your mouth and nose with a tissue. Throw the tissue away. Use the bend of your arm if a tissue is not available. Then wash your hands well with soap and water or use hand . Teach children how to cover a cough or sneeze. Wear a face covering (mask) when needed. Use a cloth covering with at least 2 layers.  You can also create layers by putting a cloth covering over a disposable non-medical mask. Cover your mouth and your nose. Follow worldwide, national, and local social distancing guidelines. Keep at least 6 feet (2 meters) between you and others. Try not to touch your face. If you get the virus on your hands, you can transfer it to your eyes, nose, or mouth and become infected. You can also transfer it to objects, surfaces, or people. Clean and disinfect high-touch surfaces and objects often. Use disinfecting wipes, or make a solution of 4 teaspoons of bleach in 1 quart (4 cups) of water. Ask about other vaccines you may need. Get the influenza (flu) vaccine as soon as recommended each year, usually starting in September or October. Get the pneumonia vaccine if recommended. Your healthcare provider can tell you if you should also get other vaccines, and when to get them. Follow social distancing guidelines:  National and local social distancing rules vary. Rules and restrictions may change over time as restrictions are lifted. The following are general guidelines:  Stay home if you are sick or think you may have COVID-19. It is important to stay home if you are waiting for a testing appointment or for test results. Avoid close physical contact with anyone who does not live in your home. Do not shake hands with, hug, or kiss a person as a greeting. If you must use public transportation (such as a bus or subway), try to sit or stand away from others. Wear your face covering. Avoid in-person gatherings and crowds. Attend virtually if possible. Follow up with your doctor as directed:  Write down your questions so you remember to ask them during your visits.   For more information:   [facility] [employer] [facility] Prevention  [address]  [place]  Phone: [phone] [phone]  Web Address: [employer]    © Copyright [employer] 2022 Information is for End User's use only and may not be sold, redistributed or otherwise used for commercial purposes. The above information is an  only. It is not intended as medical advice for individual conditions or treatments. Talk to your doctor, nurse or pharmacist before following any medical regimen to see if it is safe and effective for you.

## 2023-09-22 ENCOUNTER — OFFICE VISIT (OUTPATIENT)
Dept: OBGYN CLINIC | Facility: CLINIC | Age: 63
End: 2023-09-22
Payer: COMMERCIAL

## 2023-09-22 ENCOUNTER — APPOINTMENT (OUTPATIENT)
Dept: RADIOLOGY | Facility: CLINIC | Age: 63
End: 2023-09-22
Payer: COMMERCIAL

## 2023-09-22 VITALS
WEIGHT: 223 LBS | DIASTOLIC BLOOD PRESSURE: 83 MMHG | HEIGHT: 67 IN | SYSTOLIC BLOOD PRESSURE: 144 MMHG | HEART RATE: 80 BPM | BODY MASS INDEX: 35 KG/M2

## 2023-09-22 DIAGNOSIS — M19.011 ARTHRITIS OF RIGHT ACROMIOCLAVICULAR JOINT: ICD-10-CM

## 2023-09-22 DIAGNOSIS — M75.41 SUBACROMIAL IMPINGEMENT OF RIGHT SHOULDER: ICD-10-CM

## 2023-09-22 DIAGNOSIS — M25.511 RIGHT SHOULDER PAIN, UNSPECIFIED CHRONICITY: ICD-10-CM

## 2023-09-22 DIAGNOSIS — M75.81 TENDINITIS OF RIGHT ROTATOR CUFF: ICD-10-CM

## 2023-09-22 DIAGNOSIS — S46.211A BICEPS STRAIN, RIGHT, INITIAL ENCOUNTER: ICD-10-CM

## 2023-09-22 DIAGNOSIS — M62.838 TRAPEZIUS MUSCLE SPASM: ICD-10-CM

## 2023-09-22 DIAGNOSIS — M19.011 ARTHRITIS OF RIGHT GLENOHUMERAL JOINT: Primary | ICD-10-CM

## 2023-09-22 PROCEDURE — 73030 X-RAY EXAM OF SHOULDER: CPT

## 2023-09-22 PROCEDURE — 99204 OFFICE O/P NEW MOD 45 MIN: CPT | Performed by: FAMILY MEDICINE

## 2023-09-22 RX ORDER — NAPROXEN 500 MG/1
500 TABLET ORAL 2 TIMES DAILY WITH MEALS
Qty: 30 TABLET | Refills: 0 | Status: SHIPPED | OUTPATIENT
Start: 2023-09-22

## 2023-09-22 NOTE — PROGRESS NOTES
Assessment/Plan:  Assessment/Plan   Diagnoses and all orders for this visit:    Arthritis of right glenohumeral joint  -     Ambulatory Referral to Physical Therapy; Future    Arthritis of right acromioclavicular joint  -     Ambulatory Referral to Physical Therapy; Future    Tendinitis of right rotator cuff  -     XR shoulder 2+ vw right; Future  -     Ambulatory Referral to Physical Therapy; Future  -     naproxen (Naprosyn) 500 mg tablet; Take 1 tablet (500 mg total) by mouth 2 (two) times a day with meals    Biceps strain, right, initial encounter  -     Ambulatory Referral to Physical Therapy; Future    Subacromial impingement of right shoulder  -     Ambulatory Referral to Physical Therapy; Future    Trapezius muscle spasm  -     Ambulatory Referral to Physical Therapy; Future      40-year-old active right-hand-dominant female with right shoulder pain 1 month duration. Discussed with patient physical exam, radiographs, impression, and plan. X-rays right shoulder noted for mild glenohumeral and mild AC joint degenerative changes. Physical exam cervical spine unremarkable for midline or paraspinal tenderness. She has intact range of motion cervical spine. Axial load and Spurling's are unremarkable. Right shoulder noted for mild tenderness anterior aspect and coracoid. She has range of motion forward flexion to 170 degrees, abduction 170 degrees, and internal rotation to the lumbar spine. Empty can test is unremarkable. There is mild pain with Fernandez. She has normal strength, sensation, bicep reflex, radial pulse both upper extremities. Clinical impression is that she has symptoms from tendinitis and mild aggravation of arthritis. I discussed treatment regimen of anti-inflammatory, supplements, and formal therapy. Surgery not warranted at this time. She would like to hold off on steroid injection. She is to take naproxen 500 mg twice daily with food for 2 weeks.   She is to take turmeric at least 1000 mg daily, tart cherry at least 1000 mg daily, and glucosamine 2-3 times a day. She may apply topical diclofenac 3 times daily for the next 10 days. She is to start physical therapy as soon as possible and do home exercises as directed. She will follow-up with me as needed. Subjective:   Patient ID: Renny Sky is a 57 y.o. female. Chief Complaint   Patient presents with   • Right Shoulder - Pain       68-year-old right-hand-dominant active female presents for evaluation right shoulder pain 1 month duration. She does not recall a particular trauma or inciting event however is physically active with playing basketball and pickleball on regular basis. She has pain described as gradual in onset, generalized to the shoulder worse at the anterior aspect, none radiating, worse with elevating arm above shoulder, worse with twisting and reaching behind the back, associated with limited range of motion, and improved with resting. She does not usually take medication particular for the shoulder, but states when she takes ibuprofen for her knee it helps with the shoulder pain. She denies numbness or tingling. Shoulder Pain  This is a new problem. The current episode started more than 1 month ago. The problem occurs daily. The problem has been waxing and waning. Associated symptoms include arthralgias. Pertinent negatives include no abdominal pain, chest pain, chills, fever, joint swelling, numbness, rash, sore throat or weakness. Exacerbated by: Arm use. She has tried rest, position changes and NSAIDs for the symptoms. The treatment provided mild relief. The following portions of the patient's history were reviewed and updated as appropriate: She  has a past medical history of Asthma. She is allergic to prednisolone, fruit & vegetable daily [fish oil - food allergy], doxycycline, methimazole, and prednisone. .    Review of Systems   Constitutional: Negative for chills and fever.    HENT: Negative for sore throat. Eyes: Negative for visual disturbance. Respiratory: Negative for shortness of breath. Cardiovascular: Negative for chest pain. Gastrointestinal: Negative for abdominal pain. Genitourinary: Negative for flank pain. Musculoskeletal: Positive for arthralgias. Negative for joint swelling. Skin: Negative for rash and wound. Neurological: Negative for weakness and numbness. Hematological: Does not bruise/bleed easily. Psychiatric/Behavioral: Negative for self-injury. Objective:  Vitals:    09/22/23 0812   BP: 144/83   Pulse: 80   Weight: 101 kg (223 lb)   Height: 5' 7" (1.702 m)     Back Exam     Reflexes   Biceps: normal    Comments:    Cervical spine  - No tenderness  - Normal range of motion  - Negative axial load  - Negative Spurling's  - Normal strength, sensation, bicep reflex both upper extremities      Right Hand Exam     Muscle Strength   The patient has normal right wrist strength. Other   Sensation: normal  Pulse: present      Left Hand Exam     Muscle Strength   The patient has normal left wrist strength. Other   Sensation: normal  Pulse: present      Right Elbow Exam     Tenderness   The patient is experiencing no tenderness. Range of Motion   The patient has normal right elbow ROM. Muscle Strength   The patient has normal right elbow strength (5/5 flexion and extension). Other   Sensation: normal      Left Elbow Exam     Other   Sensation: normal      Right Shoulder Exam     Tenderness   The patient is experiencing tenderness in the biceps tendon (Coracoid).     Range of Motion   Active abduction: 170   Forward flexion: 170   Internal rotation 0 degrees: Lumbar     Muscle Strength   Abduction: 5/5   Internal rotation: 5/5   External rotation: 5/5   Supraspinatus: 5/5     Other   Sensation: normal    Comments:  Negative empty can      Left Shoulder Exam     Muscle Strength   Abduction: 5/5     Other   Sensation: normal Strength/Myotome Testing     Left Wrist/Hand   Normal wrist strength    Right Wrist/Hand   Normal wrist strength      Physical Exam  Vitals and nursing note reviewed. Constitutional:       General: She is not in acute distress. Appearance: She is well-developed. She is not ill-appearing or diaphoretic. HENT:      Head: Normocephalic and atraumatic. Right Ear: External ear normal.      Left Ear: External ear normal.   Eyes:      Conjunctiva/sclera: Conjunctivae normal.   Neck:      Trachea: No tracheal deviation. Cardiovascular:      Rate and Rhythm: Normal rate. Pulmonary:      Effort: Pulmonary effort is normal. No respiratory distress. Abdominal:      General: There is no distension. Musculoskeletal:         General: Tenderness present. No swelling, deformity or signs of injury. Skin:     General: Skin is warm and dry. Coloration: Skin is not jaundiced or pale. Neurological:      Mental Status: She is alert and oriented to person, place, and time. Psychiatric:         Mood and Affect: Mood normal.         Behavior: Behavior normal.         Thought Content: Thought content normal.         Judgment: Judgment normal.         I have personally reviewed pertinent films in PACS and my interpretation is Mild glenohumeral and AC joint degenerative changes.

## 2023-09-22 NOTE — PATIENT INSTRUCTIONS
Turmeric vitamin at least 1000 mg daily    Tart cherry vitamin at least 1000 mg daily    Diclofenac gel (voltaren gel) 3 times daily for 10 days        Take prescription Naproxen 500 mg  -twice daily  -eat first  -2 weeks  -No ibuprofen while taking this

## 2023-10-12 ENCOUNTER — EVALUATION (OUTPATIENT)
Dept: PHYSICAL THERAPY | Facility: CLINIC | Age: 63
End: 2023-10-12
Payer: MEDICARE

## 2023-10-12 DIAGNOSIS — M62.838 TRAPEZIUS MUSCLE SPASM: ICD-10-CM

## 2023-10-12 DIAGNOSIS — S46.211D BICEPS STRAIN, RIGHT, SUBSEQUENT ENCOUNTER: ICD-10-CM

## 2023-10-12 DIAGNOSIS — M75.81 TENDINITIS OF RIGHT ROTATOR CUFF: ICD-10-CM

## 2023-10-12 DIAGNOSIS — M19.011 ARTHRITIS OF RIGHT GLENOHUMERAL JOINT: ICD-10-CM

## 2023-10-12 DIAGNOSIS — M19.011 ARTHRITIS OF RIGHT ACROMIOCLAVICULAR JOINT: ICD-10-CM

## 2023-10-12 DIAGNOSIS — M75.41 SUBACROMIAL IMPINGEMENT OF RIGHT SHOULDER: ICD-10-CM

## 2023-10-12 PROCEDURE — 97110 THERAPEUTIC EXERCISES: CPT | Performed by: PHYSICAL THERAPIST

## 2023-10-12 PROCEDURE — 97161 PT EVAL LOW COMPLEX 20 MIN: CPT | Performed by: PHYSICAL THERAPIST

## 2023-10-12 NOTE — PROGRESS NOTES
PT Evaluation     Today's date: 10/12/2023  Patient name: Melissa Winston  : 1960  MRN: [id number]  Referring provider: Beryle Alfred  Dx:   Encounter Diagnosis     ICD-10-CM    1. Arthritis of right glenohumeral joint  M19.011 Ambulatory Referral to Physical Therapy      2. Arthritis of right acromioclavicular joint  M19.011 Ambulatory Referral to Physical Therapy      3. Subacromial impingement of right shoulder  M75.41 Ambulatory Referral to Physical Therapy      4. Trapezius muscle spasm  M62.838 Ambulatory Referral to Physical Therapy      5. Biceps strain, right, subsequent encounter  S46.211D Ambulatory Referral to Physical Therapy      6. Tendinitis of right rotator cuff  M75.81 Ambulatory Referral to Physical Therapy                     Assessment  Assessment details: Pt is a 56 y/o female who presents to physical therapy with primary nociceptive pain associated with R rotator cuff related shoulder pain complicated by consistent loading of the RTC due to pickleball and water aerobics. Pt does not present with any red flag symptoms at this time. Signs and symptoms consistent with this include (+) painful arc, painful resisted ER, as well as normal PROM and reduced suspicion of contribution of cervical spine. Trial treatment of RTC isometric improved ER in neutral asterisk but reaching behind the back and abd unchanged. Optimal dosage was unable to be achieved as pt arrived 15 mins late for evaluation and therefore time was limited. Education provided regarding POC, prognosis and HEP, pt verablized understanding. Also education on reducing the R shld activity with pickleball, pt verbalized understanding. Pt would benefit from skilled physical therapy in order to decrease deficits and return to prior level of function.     Impairments: abnormal or restricted ROM, activity intolerance, impaired physical strength and pain with function  Understanding of Dx/Px/POC: good  Plan  Patient would benefit from: skilled physical therapy  Planned modality interventions: cryotherapy  Planned therapy interventions: manual therapy, neuromuscular re-education, patient education, self care, strengthening, stretching, therapeutic activities, therapeutic exercise and home exercise program  Frequency: 1-2x/week. Duration in weeks: 8  Treatment plan discussed with: patient        Subjective Evaluation    History of Present Illness  Mechanism of injury: Chief Complaint: Pt reports that in the spring time she began noticing R shld pain especially at night if she was in an awkward position with her R shld (quadrant position). She reports it continued to get worse over the following months. She saw Dr. Zane Main and was given 14 days worth of an anti-inflammatory. She states that this reduced the pain but did not resolve it. She stopped the anti-inflammatory last Friday and notes that there has been some increase in pain since. Severity: low-moderate  Irritability: low  Nature: nociceptive  Stage: chronic  Stability: no change    P1: see body chart    Physical Activity: pt plays pickleball everyday of the week (started in February) as well as doing water aerobics most days and adult ballet 1x/week.  She reports some soreness with water aerobics as well as pickleball but it does not stop her from doing anything    Patient Goals  Patient goal: "no pain"        Objective     Postural Observations    Additional Postural Observation Details  Increased upper thoracic kyphosis, forward head posture    Observations     Additional Observation Details  *reach behind chair in car- 5-6/10 (horizontal abd with elbow straight)    Cervical/Thoracic Screen   Cervical range of motion within normal limits with the following exceptions: Extension quadrant with OP did not recreate symptoms, no difference in available movement b/l    Active Range of Motion     Additional Active Range of Motion Details  Flex: WNL no pain  *Abd: WNL but painful arc around 120 (5-6/10)  *ER in neutral: WNL but pain at end range (5-6/10)    Passive Range of Motion   Left Shoulder   Normal passive range of motion    Right Shoulder   Normal passive range of motion    Strength/Myotome Testing     Left Shoulder     Planes of Motion   Abduction: 5   External rotation at 0°: 5     Right Shoulder     Planes of Motion   Abduction: 4+   External rotation at 0°: 4     Additional Strength Details  Painful ER    Tests     Right Shoulder   Positive Hawkin's. Precautions: None    POC expires Unit limit Auth Expiration date PT/OT + Visit Limit?   11/7/23 6 combo  BOMN                               Therapeutic exercise = (TE)  Therapeutic activity= (TA)  Manual Therapy= (MT)  Neuromuscular re-education- (NRE)  Gait training= (GT)    TE 1. ER isometric- 3x45s 30%   *reach behind chair- increased to 6-7/10  *abd- no change  *ER in neutral- no pain  TE 2.  Pt education- reduced activity in pickleball with the R shld, HEP    NV: *reach behind chair, abd, ER in neutral  Assess thoracic rotation in sitting- how does her form look with pickleball (does she rotate enough at the thoracic spine to offload the shld- hip rotation/motor control may also be important to assess)  RTC isometric

## 2023-10-16 ENCOUNTER — OFFICE VISIT (OUTPATIENT)
Dept: PHYSICAL THERAPY | Facility: CLINIC | Age: 63
End: 2023-10-16
Payer: MEDICARE

## 2023-10-16 DIAGNOSIS — M62.838 TRAPEZIUS MUSCLE SPASM: ICD-10-CM

## 2023-10-16 DIAGNOSIS — M75.41 SUBACROMIAL IMPINGEMENT OF RIGHT SHOULDER: ICD-10-CM

## 2023-10-16 DIAGNOSIS — S46.211D BICEPS STRAIN, RIGHT, SUBSEQUENT ENCOUNTER: ICD-10-CM

## 2023-10-16 DIAGNOSIS — M19.011 ARTHRITIS OF RIGHT ACROMIOCLAVICULAR JOINT: ICD-10-CM

## 2023-10-16 DIAGNOSIS — M75.81 TENDINITIS OF RIGHT ROTATOR CUFF: ICD-10-CM

## 2023-10-16 DIAGNOSIS — M19.011 ARTHRITIS OF RIGHT GLENOHUMERAL JOINT: Primary | ICD-10-CM

## 2023-10-16 PROCEDURE — 97140 MANUAL THERAPY 1/> REGIONS: CPT | Performed by: PHYSICAL THERAPIST

## 2023-10-16 PROCEDURE — 97110 THERAPEUTIC EXERCISES: CPT | Performed by: PHYSICAL THERAPIST

## 2023-10-16 NOTE — PROGRESS NOTES
Daily Note     Today's date: 10/16/2023  Patient name: John Garcia  : 1960  MRN: [id number]  Referring provider: Cesar Renee  Dx:   Encounter Diagnosis     ICD-10-CM    1. Arthritis of right glenohumeral joint  M19.011       2. Arthritis of right acromioclavicular joint  M19.011       3. Subacromial impingement of right shoulder  M75.41       4. Trapezius muscle spasm  M62.838       5. Biceps strain, right, subsequent encounter  S46.211D       6. Tendinitis of right rotator cuff  M75.81                      Subjective: Pt reports that she is noticing a slight improvement in her shoulder symptoms specifically with putting her arm behind the passenger seat in her car. Objective: See treatment diary below      Assessment: Tolerated treatment well. Continued improvement in asterisks today with RTC strengthening. Prone T initiated for RTC strengthening- can be more isolated to supraspinatus in the future with higher elevation. Did assess thoracic motion and hip motion to further assess the contribution of these regions with pickelball, nothing noted at the thoracic spine today, and minimal change in the R hip. Pt reported history of L hip pain that seems to be related to hip abd/ER muscles (pt reported improvement with sidebend. Patient would benefit from continued PT      Plan: Continue per plan of care. Progress treatment as tolerated. Precautions: None    POC expires Unit limit Auth Expiration date PT/OT + Visit Limit?   23 6 combo  BOMN                               Therapeutic exercise = (TE)  Therapeutic activity= (TA)  Manual Therapy= (MT)  Neuromuscular re-education- (NRE)  Gait training= (GT)    TE 1. ER isometric- 3x45s 30%   *reach behind chair- increased to 6-7/10  *abd- no change  *ER in neutral- no pain  TE 2. Pt education- reduced activity in pickleball with the R shld, HEP    NV: *reach behind chair, abd, ER in neutral  TE 1.  Assessed thoracic motion- no change b/l  TE 2. RTC isometric- 3x45" 75%  *reach behind the chair- no change  *abd- no change  *ER- Slightly improved  TE 3. RTC isometric- 3x45" 75%  *reach behind the chair- no change  *abd- no change  *ER no pain  TE 4. Prone T- 3# R 1x12, 1x14; L 1x16, 1x16  *reach behind the chair- no change  *abd- no pain  *ER no pain  MT 5.  Hip ROM assessment- reduced R hip IR, L hip pain with MMT hip IR

## 2023-10-27 ENCOUNTER — OFFICE VISIT (OUTPATIENT)
Dept: PHYSICAL THERAPY | Facility: CLINIC | Age: 63
End: 2023-10-27
Payer: MEDICARE

## 2023-10-27 DIAGNOSIS — M19.011 ARTHRITIS OF RIGHT GLENOHUMERAL JOINT: Primary | ICD-10-CM

## 2023-10-27 DIAGNOSIS — M75.81 TENDINITIS OF RIGHT ROTATOR CUFF: ICD-10-CM

## 2023-10-27 DIAGNOSIS — M19.011 ARTHRITIS OF RIGHT ACROMIOCLAVICULAR JOINT: ICD-10-CM

## 2023-10-27 DIAGNOSIS — S46.211D BICEPS STRAIN, RIGHT, SUBSEQUENT ENCOUNTER: ICD-10-CM

## 2023-10-27 DIAGNOSIS — M75.41 SUBACROMIAL IMPINGEMENT OF RIGHT SHOULDER: ICD-10-CM

## 2023-10-27 DIAGNOSIS — M62.838 TRAPEZIUS MUSCLE SPASM: ICD-10-CM

## 2023-10-27 PROCEDURE — 97110 THERAPEUTIC EXERCISES: CPT | Performed by: PHYSICAL THERAPIST

## 2023-10-27 NOTE — PROGRESS NOTES
Daily Note     Today's date: 10/27/2023  Patient name: John Garcia  : 1960  MRN: [id number]  Referring provider: Cesar Renee  Dx: No diagnosis found. Subjective: Pt reports some continued improvement. Objective: See treatment diary below      Assessment: Tolerated treatment fair. Progressed ER strengthening to isokinetic. Pt demonstrated improvement with this. Prone T still weak compared to contra side. Higher weight did bring back pain with testing however, unsure if this is contraindicated as no pain while doing the exercise. Therefore, increased weight for HEP. Patient would benefit from continued PT      Plan: Continue per plan of care. Progress treatment as tolerated. Precautions: None    POC expires Unit limit Auth Expiration date PT/OT + Visit Limit?   23 6 combo  BOMN                               Therapeutic exercise = (TE)  Therapeutic activity= (TA)  Manual Therapy= (MT)  Neuromuscular re-education- (NRE)  Gait training= (GT)    TE 1. ER isometric- 3x45s 30%   *reach behind chair- increased to 6-7/10  *abd- no change  *ER in neutral- no pain  TE 2. Pt education- reduced activity in pickleball with the R shld, HEP    NV: *reach behind chair, abd, ER in neutral  TE 1. Assessed thoracic motion- no change b/l  TE 2. RTC isometric- 3x45" 75%  *reach behind the chair- no change  *abd- no change  *ER- Slightly improved  TE 3. RTC isometric- 3x45" 75%  *reach behind the chair- no change  *abd- no change  *ER no pain  TE 4. Prone T- 3# R 1x12, 1x14; L 1x16, 1x16  *reach behind the chair- no change  *abd- no pain  *ER no pain  MT 5. Hip ROM assessment- reduced R hip IR, L hip pain with MMT hip IR    10/27: *reach behind chair, abd, ER in neutral  TE 1. Reassessment  *ER - 5/10  *Abd- 5/10  *reach behind- 5-6/10  TE 2.  Sidelying ER- metronome 3-0-4-0, 1x12  *reach behind the chair- no pain in 90d abd, 5-6/10 right at end range  *abd- no pain  *ER- 3-4/10  TE 3. Sidelying ER- metronome 3-0-4-0, 1x12  *reach behind the chair- no change  *abd- no change  *ER no pain  TE 4. Prone T- 3# R 3x12  *reach behind the chair- no change  *abd- no change  *ER no change  TE 5. Prone T- 5# 1x10  *worsened  TE 6.  Pt education- metronome usage at home

## 2023-11-01 ENCOUNTER — OFFICE VISIT (OUTPATIENT)
Dept: PHYSICAL THERAPY | Facility: CLINIC | Age: 63
End: 2023-11-01
Payer: MEDICARE

## 2023-11-01 DIAGNOSIS — M75.41 SUBACROMIAL IMPINGEMENT OF RIGHT SHOULDER: ICD-10-CM

## 2023-11-01 DIAGNOSIS — S46.211D BICEPS STRAIN, RIGHT, SUBSEQUENT ENCOUNTER: ICD-10-CM

## 2023-11-01 DIAGNOSIS — M19.011 ARTHRITIS OF RIGHT GLENOHUMERAL JOINT: Primary | ICD-10-CM

## 2023-11-01 DIAGNOSIS — M62.838 TRAPEZIUS MUSCLE SPASM: ICD-10-CM

## 2023-11-01 DIAGNOSIS — M19.011 ARTHRITIS OF RIGHT ACROMIOCLAVICULAR JOINT: ICD-10-CM

## 2023-11-01 DIAGNOSIS — M75.81 TENDINITIS OF RIGHT ROTATOR CUFF: ICD-10-CM

## 2023-11-01 PROCEDURE — 97140 MANUAL THERAPY 1/> REGIONS: CPT | Performed by: PHYSICAL THERAPIST

## 2023-11-01 PROCEDURE — 97110 THERAPEUTIC EXERCISES: CPT | Performed by: PHYSICAL THERAPIST

## 2023-11-01 NOTE — PROGRESS NOTES
Daily Note     Today's date: 2023  Patient name: Roxy Ortez  : 1960  MRN: [id number]  Referring provider: Maureen Burns  Dx:   Encounter Diagnosis     ICD-10-CM    1. Arthritis of right glenohumeral joint  M19.011       2. Arthritis of right acromioclavicular joint  M19.011       3. Subacromial impingement of right shoulder  M75.41       4. Trapezius muscle spasm  M62.838       5. Biceps strain, right, subsequent encounter  S46.211D       6. Tendinitis of right rotator cuff  M75.81                      Subjective: Pt offers no new complaints. Objective: See treatment diary below      Assessment: Tolerated treatment well. Improved asterisks upon arrival compared to previous visits. Slow improvement being noted. Prone Y with no pain 4/10 pain but able to tolerate. Will trial more manual therapy at next visit due to the amount of pain in quadrant position with PROM. Patient would benefit from continued PT      Plan: Continue per plan of care. Progress treatment as tolerated. Precautions: None    POC expires Unit limit Auth Expiration date PT/OT + Visit Limit?   23 6 combo  BOMN                               Therapeutic exercise = (TE)  Therapeutic activity= (TA)  Manual Therapy= (MT)  Neuromuscular re-education- (NRE)  Gait training= (GT)    TE 1. ER isometric- 3x45s 30%   *reach behind chair- increased to 6-7/10  *abd- no change  *ER in neutral- no pain  TE 2. Pt education- reduced activity in pickleball with the R shld, HEP    NV: *reach behind chair, abd, ER in neutral  TE 1. Assessed thoracic motion- no change b/l  TE 2. RTC isometric- 3x45" 75%  *reach behind the chair- no change  *abd- no change  *ER- Slightly improved  TE 3. RTC isometric- 3x45" 75%  *reach behind the chair- no change  *abd- no change  *ER no pain  TE 4. Prone T- 3# R 1x12, 1x14; L 1x16, 1x16  *reach behind the chair- no change  *abd- no pain  *ER no pain  MT 5.  Hip ROM assessment- reduced R hip IR, L hip pain with MMT hip IR    10/27: *reach behind chair, abd, ER in neutral  TE 1. Reassessment  *ER - 5/10  *Abd- 5/10  *reach behind- 5-6/10  TE 2. Sidelying ER- metronome 3-0-4-0, 1x12  *reach behind the chair- no pain in 90d abd, 5-6/10 right at end range  *abd- no pain  *ER- 3-4/10  TE 3. Sidelying ER- metronome 3-0-4-0, 1x12  *reach behind the chair- no change  *abd- no change  *ER no pain  TE 4. Prone T- 3# R 3x12  *reach behind the chair- no change  *abd- no change  *ER no change  TE 5. Prone T- 5# 1x10  *worsened  TE 6. Pt education- metronome usage at home      10/27: *reach behind chair, abd, ER in neutral  TE 1. Reassessment  *ER - 0/10  *Abd- 2/10  *reach behind- 4-5/10  TE 2. Sidelying ER- metronome 3-0-4-0, 1x18, 1x15, 1x15 3#  *reach behind the chair- no pain in 90d abd, 5-6/10 right at end range  *abd- no pain  *ER- /10  TE 3. Prone T- 5# R 3x12  *reach behind the chair- no change  *abd- no change  *ER no change  MT 4. Abd shoulder mobs- EMILI ~10' gd 2-3 in increasing abd ROM inf glide to inf glide roll  *no change in asterisks  TE 5.  Prone Y-  2x12  *no change in asterisks except abd no longer pain in mid range

## 2023-11-09 ENCOUNTER — OFFICE VISIT (OUTPATIENT)
Dept: PHYSICAL THERAPY | Facility: CLINIC | Age: 63
End: 2023-11-09
Payer: MEDICARE

## 2023-11-09 DIAGNOSIS — S46.211D BICEPS STRAIN, RIGHT, SUBSEQUENT ENCOUNTER: ICD-10-CM

## 2023-11-09 DIAGNOSIS — M75.41 SUBACROMIAL IMPINGEMENT OF RIGHT SHOULDER: ICD-10-CM

## 2023-11-09 DIAGNOSIS — M62.838 TRAPEZIUS MUSCLE SPASM: ICD-10-CM

## 2023-11-09 DIAGNOSIS — M19.011 ARTHRITIS OF RIGHT GLENOHUMERAL JOINT: Primary | ICD-10-CM

## 2023-11-09 DIAGNOSIS — M19.011 ARTHRITIS OF RIGHT ACROMIOCLAVICULAR JOINT: ICD-10-CM

## 2023-11-09 DIAGNOSIS — M75.81 TENDINITIS OF RIGHT ROTATOR CUFF: ICD-10-CM

## 2023-11-09 PROCEDURE — 97110 THERAPEUTIC EXERCISES: CPT | Performed by: PHYSICAL THERAPIST

## 2023-11-09 PROCEDURE — 97140 MANUAL THERAPY 1/> REGIONS: CPT | Performed by: PHYSICAL THERAPIST

## 2023-11-09 NOTE — PROGRESS NOTES
PT Re-evaluation     Today's date: 2023  Patient name: Aleksey Rinaldi  : 1960  MRN: [id number]  Referring provider: Jose M Lugo  Dx:   Encounter Diagnosis     ICD-10-CM    1. Arthritis of right glenohumeral joint  M19.011 Ambulatory Referral to Physical Therapy      2. Arthritis of right acromioclavicular joint  M19.011 Ambulatory Referral to Physical Therapy      3. Subacromial impingement of right shoulder  M75.41 Ambulatory Referral to Physical Therapy      4. Trapezius muscle spasm  M62.838 Ambulatory Referral to Physical Therapy      5. Biceps strain, right, subsequent encounter  S46.211D Ambulatory Referral to Physical Therapy      6. Tendinitis of right rotator cuff  M75.81 Ambulatory Referral to Physical Therapy                     Assessment  Assessment details: Pt is a 56 y/o female who presents to physical therapy with primary nociceptive pain associated with R rotator cuff related shoulder pain complicated by consistent loading of the RTC due to pickleball and water aerobics. Pt does not present with any red flag symptoms at this time. Pt continues to have pain. Some improvement was being made but it seems that it has stalled. Pt has not changed her workout routine and this certainly can be perpetuating a RTC pathology as she will get pain with this. However, did want to further assess the cervical spine as it has not been fully cleared. Although no symptoms with cervical ROM, PAIVM testing revealed hypomobility throughout the cervical spine related to soft tissue, as well as significant hypomobility at R UPA on C5. Following treatment to this region, pt demonstrated good improvement in shoulder motions with less pain. Added cervical mobility to HEP. Will continue to treat both regions as it is unclear that either can be ruled out as contributing to the current symptoms.  Pt would benefit from skilled physical therapy in order to decrease deficits and return to prior level of function. Impairments: abnormal or restricted ROM, activity intolerance, impaired physical strength and pain with function  Understanding of Dx/Px/POC: good  Plan  Patient would benefit from: skilled physical therapy  Planned modality interventions: cryotherapy  Planned therapy interventions: manual therapy, neuromuscular re-education, patient education, self care, strengthening, stretching, therapeutic activities, therapeutic exercise and home exercise program  Frequency: 1-2x/week. Duration in weeks: 8  Treatment plan discussed with: patient        Subjective Evaluation    History of Present Illness  Mechanism of injury: Chief Complaint: Pt reports that in the spring time she began noticing R shld pain especially at night if she was in an awkward position with her R shld (quadrant position). She reports it continued to get worse over the following months. She saw Dr. Jovanna Rogers and was given 14 days worth of an anti-inflammatory. She states that this reduced the pain but did not resolve it. She stopped the anti-inflammatory last Friday and notes that there has been some increase in pain since. 11/9: Pt reports some improvement since beginning physical therapy. She has maintained her exercise routine without changing it. Severity: low-moderate  Irritability: low  Nature: nociceptive  Stage: chronic  Stability: no change    P1: see body chart    Physical Activity: pt plays pickleball everyday of the week (started in February) as well as doing water aerobics most days and adult ballet 1x/week.  She reports some soreness with water aerobics as well as pickleball but it does not stop her from doing anything    Patient Goals  Patient goal: "no pain"        Objective     Postural Observations    Additional Postural Observation Details  Increased upper thoracic kyphosis, forward head posture    Observations     Additional Observation Details  *reach behind chair in car- 5-6/10 (horizontal abd with elbow straight)    Cervical/Thoracic Screen   Cervical range of motion within normal limits with the following exceptions: Extension quadrant with OP did not recreate symptoms, no difference in available movement b/l    Active Range of Motion     Additional Active Range of Motion Details  Flex: WNL no pain; WNL 3/10  *Abd: WNL but painful arc around 120 (5-6/10); WNL 5/10  *ER in neutral: WNL but pain at end range (5-6/10); 4/10 WNL    Passive Range of Motion   Left Shoulder   Normal passive range of motion    Right Shoulder   Normal passive range of motion    Strength/Myotome Testing     Left Shoulder     Planes of Motion   Abduction: 5   External rotation at 0°: 5     Right Shoulder     Planes of Motion   Abduction: 4+   External rotation at 0°: 4     Additional Strength Details  Painful ER    Tests     Right Shoulder   Positive Hawkin's. Cervical jt mobs  Hypomobility at R UPA the most limited and local tenderness, soft tissue restriction and local tenderness throughout the R paraspinals           Precautions: None    POC expires Unit limit Auth Expiration date PT/OT + Visit Limit?   11/7/23 6 combo  BOMN                               Therapeutic exercise = (TE)  Therapeutic activity= (TA)  Manual Therapy= (MT)  Neuromuscular re-education- (NRE)  Gait training= (GT)    10/27: *reach behind chair, abd, ER in neutral  TE 1. Reassessment  *ER - 5/10  *Abd- 5/10  *reach behind- 5-6/10  TE 2. Sidelying ER- metronome 3-0-4-0, 1x12  *reach behind the chair- no pain in 90d abd, 5-6/10 right at end range  *abd- no pain  *ER- 3-4/10  TE 3. Sidelying ER- metronome 3-0-4-0, 1x12  *reach behind the chair- no change  *abd- no change  *ER no pain  TE 4. Prone T- 3# R 3x12  *reach behind the chair- no change  *abd- no change  *ER no change  TE 5. Prone T- 5# 1x10  *worsened  TE 6. Pt education- metronome usage at home      11/1: *reach behind chair, abd, ER in neutral  TE 1.  Reassessment  *ER - 0/10  *Abd- 2/10  *reach behind- 4-5/10  TE 2. Sidelying ER- metronome 3-0-4-0, 1x18, 1x15, 1x15 3#  *reach behind the chair- no pain in 90d abd, 5-6/10 right at end range  *abd- no pain  *ER- /10  TE 3. Prone T- 5# R 3x12  *reach behind the chair- no change  *abd- no change  *ER no change  MT 4. Abd shoulder mobs- EMILI ~10' gd 2-3 in increasing abd ROM inf glide to inf glide roll  *no change in asterisks  TE 5. Prone Y-  2x12  *no change in asterisks except abd no longer pain in mid range    11/9:  TE 1. Reassessment  MT 2. R UPA C5- gd IV-IV+ 2x3'  *shld ER no change  *Shld flex- no pain  *shld abd- no change  MT 3. Supine raking soft tissue technique- EMILI 5' with intermittent upglide mob  *shld flex- no pain  *Shld ER- reduced pain 3/10  *shld abd- reduced pain 3/10  TE 4.  Cervical SNAG- HEP prescription

## 2023-11-14 ENCOUNTER — OFFICE VISIT (OUTPATIENT)
Dept: PHYSICAL THERAPY | Facility: CLINIC | Age: 63
End: 2023-11-14
Payer: MEDICARE

## 2023-11-14 DIAGNOSIS — M75.81 TENDINITIS OF RIGHT ROTATOR CUFF: ICD-10-CM

## 2023-11-14 DIAGNOSIS — M19.011 ARTHRITIS OF RIGHT ACROMIOCLAVICULAR JOINT: ICD-10-CM

## 2023-11-14 DIAGNOSIS — S46.211D BICEPS STRAIN, RIGHT, SUBSEQUENT ENCOUNTER: ICD-10-CM

## 2023-11-14 DIAGNOSIS — M62.838 TRAPEZIUS MUSCLE SPASM: ICD-10-CM

## 2023-11-14 DIAGNOSIS — M75.41 SUBACROMIAL IMPINGEMENT OF RIGHT SHOULDER: ICD-10-CM

## 2023-11-14 DIAGNOSIS — M19.011 ARTHRITIS OF RIGHT GLENOHUMERAL JOINT: Primary | ICD-10-CM

## 2023-11-14 PROCEDURE — 97140 MANUAL THERAPY 1/> REGIONS: CPT | Performed by: PHYSICAL THERAPIST

## 2023-11-14 PROCEDURE — 97110 THERAPEUTIC EXERCISES: CPT | Performed by: PHYSICAL THERAPIST

## 2023-11-14 NOTE — PROGRESS NOTES
Daily Note     Today's date: 2023  Patient name: Dennis Marino  : 1960  MRN: [id number]  Referring provider: Antonia Reno  Dx:   Encounter Diagnosis     ICD-10-CM    1. Arthritis of right glenohumeral joint  M19.011       2. Subacromial impingement of right shoulder  M75.41       3. Arthritis of right acromioclavicular joint  M19.011       4. Trapezius muscle spasm  M62.838       5. Biceps strain, right, subsequent encounter  S46.211D       6. Tendinitis of right rotator cuff  M75.81                      Subjective: Pt reports that she is continuing to feel her shoulder pain. She is wondering if the exercises are increasing her pain. She also reports an improvement in her symptoms following last treatment that focused on the neck. Unsure of how long that lasted. Objective: See treatment diary below      Assessment: Tolerated treatment well. Educated pt on regression of exercise to reduce overload on the RTC. Acute rest phase of 3 days, then return to exercise but every other day. Exercise should be non-painful. Followed up with treatment to the neck. Continued severe hypomobility of R UPA at C5, jt mobs only slightly improved. Some improvement in shoulder ROM following. Patient would benefit from continued PT      Plan: Continue per plan of care. Progress treatment as tolerated. Precautions: None    POC expires Unit limit Auth Expiration date PT/OT + Visit Limit?   23 6 combo  BOMN                               Therapeutic exercise = (TE)  Therapeutic activity= (TA)  Manual Therapy= (MT)  Neuromuscular re-education- (NRE)  Gait training= (GT)    TE 1. ER isometric- 3x45s 30%   *reach behind chair- increased to 6-7/10  *abd- no change  *ER in neutral- no pain  TE 2. Pt education- reduced activity in pickleball with the R shld, HEP    NV: *reach behind chair, abd, ER in neutral  TE 1. Assessed thoracic motion- no change b/l  TE 2.  RTC isometric- 3x45" 75%  *reach behind the chair- no change  *abd- no change  *ER- Slightly improved  TE 3. RTC isometric- 3x45" 75%  *reach behind the chair- no change  *abd- no change  *ER no pain  TE 4. Prone T- 3# R 1x12, 1x14; L 1x16, 1x16  *reach behind the chair- no change  *abd- no pain  *ER no pain  MT 5. Hip ROM assessment- reduced R hip IR, L hip pain with MMT hip IR    10/27: *reach behind chair, abd, ER in neutral  TE 1. Reassessment  *ER - 5/10  *Abd- 5/10  *reach behind- 5-6/10  TE 2. Sidelying ER- metronome 3-0-4-0, 1x12  *reach behind the chair- no pain in 90d abd, 5-6/10 right at end range  *abd- no pain  *ER- 3-4/10  TE 3. Sidelying ER- metronome 3-0-4-0, 1x12  *reach behind the chair- no change  *abd- no change  *ER no pain  TE 4. Prone T- 3# R 3x12  *reach behind the chair- no change  *abd- no change  *ER no change  TE 5. Prone T- 5# 1x10  *worsened  TE 6. Pt education- metronome usage at home      10/27: *reach behind chair, abd, ER in neutral  TE 1. Reassessment  *ER - 0/10  *Abd- 2/10  *reach behind- 4-5/10  TE 2. Sidelying ER- metronome 3-0-4-0, 1x18, 1x15, 1x15 3#  *reach behind the chair- no pain in 90d abd, 5-6/10 right at end range  *abd- no pain  *ER- /10  TE 3. Prone T- 5# R 3x12  *reach behind the chair- no change  *abd- no change  *ER no change  MT 4. Abd shoulder mobs- EMILI ~10' gd 2-3 in increasing abd ROM inf glide to inf glide roll  *no change in asterisks  TE 5. Prone Y-  2x12  *no change in asterisks except abd no longer pain in mid range    11/9:  TE 1. Reassessment  MT 2. R UPA C5- gd IV-IV+ 2x3'  *shld ER no change  *Shld flex- no pain  *shld abd- no change  MT 3. Supine raking soft tissue technique- EMILI 5' with intermittent upglide mob  *shld flex- no pain  *Shld ER- reduced pain 3/10  *shld abd- reduced pain 3/10  TE 4. Cervical SNAG- HEP prescription    11/14:   *shld abd- 4/10  *Shld ER- 4/10  *reaching behind the back- 5-6/10  TE 1.  Education on rest and acceptable pain level- 13'  MT 2. R UPA C5- gd IV-IV+ 2x3'  *shld abd- no pain  *Shld ER- 4/10  *shld abd- 4-5/10

## 2023-11-21 ENCOUNTER — OFFICE VISIT (OUTPATIENT)
Dept: PHYSICAL THERAPY | Facility: CLINIC | Age: 63
End: 2023-11-21
Payer: MEDICARE

## 2023-11-21 DIAGNOSIS — M62.838 TRAPEZIUS MUSCLE SPASM: ICD-10-CM

## 2023-11-21 DIAGNOSIS — M19.011 ARTHRITIS OF RIGHT ACROMIOCLAVICULAR JOINT: ICD-10-CM

## 2023-11-21 DIAGNOSIS — M75.41 SUBACROMIAL IMPINGEMENT OF RIGHT SHOULDER: ICD-10-CM

## 2023-11-21 DIAGNOSIS — S46.211D BICEPS STRAIN, RIGHT, SUBSEQUENT ENCOUNTER: ICD-10-CM

## 2023-11-21 DIAGNOSIS — M75.81 TENDINITIS OF RIGHT ROTATOR CUFF: ICD-10-CM

## 2023-11-21 DIAGNOSIS — M19.011 ARTHRITIS OF RIGHT GLENOHUMERAL JOINT: Primary | ICD-10-CM

## 2023-11-21 PROCEDURE — 97140 MANUAL THERAPY 1/> REGIONS: CPT | Performed by: PHYSICAL THERAPIST

## 2023-11-21 NOTE — PROGRESS NOTES
Daily Note     Today's date: 2023  Patient name: Alena Salazar  : 1960  MRN: [id number]  Referring provider: Elliot Johnson  Dx:   Encounter Diagnosis     ICD-10-CM    1. Arthritis of right glenohumeral joint  M19.011       2. Subacromial impingement of right shoulder  M75.41       3. Arthritis of right acromioclavicular joint  M19.011       4. Trapezius muscle spasm  M62.838       5. Biceps strain, right, subsequent encounter  S46.211D       6. Tendinitis of right rotator cuff  M75.81                      Subjective: Pt reports that her shoulder is doing better since the reduction of exercise. Objective: See treatment diary below      Assessment: Tolerated treatment well. R UPA improved compared to last visit with less hypomobility. Inf glide of the R shld also improved her symptoms following. No hypomobility noted, these are done in an effort to continue to calm the pain down. She is doing well with HEP and therefore no need to assess it during today's session. Patient would benefit from continued PT      Plan: Continue per plan of care. Progress treatment as tolerated. Precautions: None    POC expires Unit limit Auth Expiration date PT/OT + Visit Limit?   23 6 combo  BOMN                               Therapeutic exercise = (TE)  Therapeutic activity= (TA)  Manual Therapy= (MT)  Neuromuscular re-education- (NRE)  Gait training= (GT)    TE 1. ER isometric- 3x45s 30%   *reach behind chair- increased to 6-7/10  *abd- no change  *ER in neutral- no pain  TE 2. Pt education- reduced activity in pickleball with the R shld, HEP    NV: *reach behind chair, abd, ER in neutral  TE 1. Assessed thoracic motion- no change b/l  TE 2. RTC isometric- 3x45" 75%  *reach behind the chair- no change  *abd- no change  *ER- Slightly improved  TE 3. RTC isometric- 3x45" 75%  *reach behind the chair- no change  *abd- no change  *ER no pain  TE 4.  Prone T- 3# R 1x12, 1x14; L 1x16, 1x16  *reach behind the chair- no change  *abd- no pain  *ER no pain  MT 5. Hip ROM assessment- reduced R hip IR, L hip pain with MMT hip IR    10/27: *reach behind chair, abd, ER in neutral  TE 1. Reassessment  *ER - 5/10  *Abd- 5/10  *reach behind- 5-6/10  TE 2. Sidelying ER- metronome 3-0-4-0, 1x12  *reach behind the chair- no pain in 90d abd, 5-6/10 right at end range  *abd- no pain  *ER- 3-4/10  TE 3. Sidelying ER- metronome 3-0-4-0, 1x12  *reach behind the chair- no change  *abd- no change  *ER no pain  TE 4. Prone T- 3# R 3x12  *reach behind the chair- no change  *abd- no change  *ER no change  TE 5. Prone T- 5# 1x10  *worsened  TE 6. Pt education- metronome usage at home      10/27: *reach behind chair, abd, ER in neutral  TE 1. Reassessment  *ER - 0/10  *Abd- 2/10  *reach behind- 4-5/10  TE 2. Sidelying ER- metronome 3-0-4-0, 1x18, 1x15, 1x15 3#  *reach behind the chair- no pain in 90d abd, 5-6/10 right at end range  *abd- no pain  *ER- /10  TE 3. Prone T- 5# R 3x12  *reach behind the chair- no change  *abd- no change  *ER no change  MT 4. Abd shoulder mobs- EMILI ~10' gd 2-3 in increasing abd ROM inf glide to inf glide roll  *no change in asterisks  TE 5. Prone Y-  2x12  *no change in asterisks except abd no longer pain in mid range    11/9:  TE 1. Reassessment  MT 2. R UPA C5- gd IV-IV+ 2x3'  *shld ER no change  *Shld flex- no pain  *shld abd- no change  MT 3. Supine raking soft tissue technique- EMILI 5' with intermittent upglide mob  *shld flex- no pain  *Shld ER- reduced pain 3/10  *shld abd- reduced pain 3/10  TE 4. Cervical SNAG- HEP prescription    11/14:   *shld abd- 4/10  *Shld ER- 4/10  *reaching behind the back- 5-6/10  TE 1.  Education on rest and acceptable pain level- 13'  MT 2. R UPA C5- gd IV-IV+ 2x3'  *shld abd- no pain  *Shld ER- 4/10  *shld abd- 4-5/10    11/21:   *shld abd- 1/10  *Shld ER- 1/10  *reaching behind the back- 3/10  MT 1. R UPA C5- gd IV-IV+ 2x3'  *shld abd- <1/10  *Shld ER- 1/10  *shld abd- 3/10  MT 2.  Shld inf glide mob- gd 2 with increasing abduction ROM 10'  *shld abd- <1/10  *shld ER- <1/10  *reaching behind the back- 2-3/10

## 2023-11-28 ENCOUNTER — OFFICE VISIT (OUTPATIENT)
Dept: PHYSICAL THERAPY | Facility: CLINIC | Age: 63
End: 2023-11-28
Payer: MEDICARE

## 2023-11-28 DIAGNOSIS — M19.011 ARTHRITIS OF RIGHT ACROMIOCLAVICULAR JOINT: ICD-10-CM

## 2023-11-28 DIAGNOSIS — M62.838 TRAPEZIUS MUSCLE SPASM: ICD-10-CM

## 2023-11-28 DIAGNOSIS — S46.211D BICEPS STRAIN, RIGHT, SUBSEQUENT ENCOUNTER: ICD-10-CM

## 2023-11-28 DIAGNOSIS — M75.81 TENDINITIS OF RIGHT ROTATOR CUFF: ICD-10-CM

## 2023-11-28 DIAGNOSIS — M19.011 ARTHRITIS OF RIGHT GLENOHUMERAL JOINT: Primary | ICD-10-CM

## 2023-11-28 DIAGNOSIS — M75.41 SUBACROMIAL IMPINGEMENT OF RIGHT SHOULDER: ICD-10-CM

## 2023-11-28 PROCEDURE — 97140 MANUAL THERAPY 1/> REGIONS: CPT | Performed by: PHYSICAL THERAPIST

## 2023-11-28 PROCEDURE — 97110 THERAPEUTIC EXERCISES: CPT | Performed by: PHYSICAL THERAPIST

## 2023-11-28 NOTE — PROGRESS NOTES
Daily Note     Today's date: 2023  Patient name: Parvez Sabillon  : 1960  MRN: [id number]  Referring provider: Mildred Gil  Dx: No diagnosis found. Subjective: Pt reports she has not done much exercise since last Tuesday, she reports feeling good. Objective: See treatment diary below      Assessment: Tolerated treatment well. Focused more on the upper thoracic spine due to the limitation in motion and local pain created with this. Patient would benefit from continued PT      Plan: Continue per plan of care. Progress treatment as tolerated. Precautions: None    POC expires Unit limit Auth Expiration date PT/OT + Visit Limit?   23 6 combo  BOMN                               Therapeutic exercise = (TE)  Therapeutic activity= (TA)  Manual Therapy= (MT)  Neuromuscular re-education- (NRE)  Gait training= (GT)    TE 1. ER isometric- 3x45s 30%   *reach behind chair- increased to 6-7/10  *abd- no change  *ER in neutral- no pain  TE 2. Pt education- reduced activity in pickleball with the R shld, HEP    NV: *reach behind chair, abd, ER in neutral  TE 1. Assessed thoracic motion- no change b/l  TE 2. RTC isometric- 3x45" 75%  *reach behind the chair- no change  *abd- no change  *ER- Slightly improved  TE 3. RTC isometric- 3x45" 75%  *reach behind the chair- no change  *abd- no change  *ER no pain  TE 4. Prone T- 3# R 1x12, 1x14; L 1x16, 1x16  *reach behind the chair- no change  *abd- no pain  *ER no pain  MT 5. Hip ROM assessment- reduced R hip IR, L hip pain with MMT hip IR    10/27: *reach behind chair, abd, ER in neutral  TE 1. Reassessment  *ER - 5/10  *Abd- 5/10  *reach behind- 5-6/10  TE 2. Sidelying ER- metronome 3-0-4-0, 1x12  *reach behind the chair- no pain in 90d abd, 5-6/10 right at end range  *abd- no pain  *ER- 3-4/10  TE 3. Sidelying ER- metronome 3-0-4-0, 1x12  *reach behind the chair- no change  *abd- no change  *ER no pain  TE 4.  Prone T- 3# R 3x12  *reach behind the chair- no change  *abd- no change  *ER no change  TE 5. Prone T- 5# 1x10  *worsened  TE 6. Pt education- metronome usage at home      10/27: *reach behind chair, abd, ER in neutral  TE 1. Reassessment  *ER - 0/10  *Abd- 2/10  *reach behind- 4-5/10  TE 2. Sidelying ER- metronome 3-0-4-0, 1x18, 1x15, 1x15 3#  *reach behind the chair- no pain in 90d abd, 5-6/10 right at end range  *abd- no pain  *ER- /10  TE 3. Prone T- 5# R 3x12  *reach behind the chair- no change  *abd- no change  *ER no change  MT 4. Abd shoulder mobs- EMILI ~10' gd 2-3 in increasing abd ROM inf glide to inf glide roll  *no change in asterisks  TE 5. Prone Y-  2x12  *no change in asterisks except abd no longer pain in mid range    11/9:  TE 1. Reassessment  MT 2. R UPA C5- gd IV-IV+ 2x3'  *shld ER no change  *Shld flex- no pain  *shld abd- no change  MT 3. Supine raking soft tissue technique- EMILI 5' with intermittent upglide mob  *shld flex- no pain  *Shld ER- reduced pain 3/10  *shld abd- reduced pain 3/10  TE 4. Cervical SNAG- HEP prescription    11/14:   *shld abd- 4/10  *Shld ER- 4/10  *reaching behind the back- 5-6/10  TE 1. Education on rest and acceptable pain level- 13'  MT 2. R UPA C5- gd IV-IV+ 2x3'  *shld abd- no pain  *Shld ER- 4/10  *shld abd- 4-5/10    11/21:   *shld abd- 1/10  *Shld ER- 1/10  *reaching behind the back- 3/10  MT 1. R UPA C5- gd IV-IV+ 2x3'  *shld abd- <1/10  *Shld ER- 1/10  *shld abd- 3/10  MT 2. Shld inf glide mob- gd 2 with increasing abduction ROM 10'  *shld abd- <1/10  *shld ER- <1/10  *reaching behind the back- 2-3/10    11/21:   *shld abd- 1/10  *Shld ER- 2/10  *reaching behind the back- 3/10  MT 1. Prone CPA T1-3- gd 3-3+ 10'  MT 2. Prone transverse spinous process- EMILI gd 3 10'  *shld abd- <1/10  *shld ER- <1/10  *reaching behind the back- 2-3/10  MT 3. Supine PNF cervical dorsal glide- 2x10  TE 4.  Supine cervical dorsal glide over towel - 2x8 different positions  *shld abd- <1/10  *shld ER- <1/10  *reaching behind the back- 1-2/10

## 2023-12-05 ENCOUNTER — OFFICE VISIT (OUTPATIENT)
Dept: PHYSICAL THERAPY | Facility: CLINIC | Age: 63
End: 2023-12-05
Payer: MEDICARE

## 2023-12-05 DIAGNOSIS — M75.81 TENDINITIS OF RIGHT ROTATOR CUFF: ICD-10-CM

## 2023-12-05 DIAGNOSIS — M19.011 ARTHRITIS OF RIGHT ACROMIOCLAVICULAR JOINT: ICD-10-CM

## 2023-12-05 DIAGNOSIS — M19.011 ARTHRITIS OF RIGHT GLENOHUMERAL JOINT: Primary | ICD-10-CM

## 2023-12-05 DIAGNOSIS — M75.41 SUBACROMIAL IMPINGEMENT OF RIGHT SHOULDER: ICD-10-CM

## 2023-12-05 DIAGNOSIS — M62.838 TRAPEZIUS MUSCLE SPASM: ICD-10-CM

## 2023-12-05 DIAGNOSIS — S46.211D BICEPS STRAIN, RIGHT, SUBSEQUENT ENCOUNTER: ICD-10-CM

## 2023-12-05 PROCEDURE — 97112 NEUROMUSCULAR REEDUCATION: CPT | Performed by: PHYSICAL THERAPIST

## 2023-12-05 PROCEDURE — 97110 THERAPEUTIC EXERCISES: CPT | Performed by: PHYSICAL THERAPIST

## 2023-12-05 PROCEDURE — 97140 MANUAL THERAPY 1/> REGIONS: CPT | Performed by: PHYSICAL THERAPIST

## 2023-12-05 NOTE — PROGRESS NOTES
PT Re-evaluation     Today's date: 2023  Patient name: Anoop Baxter  : 1960  MRN: [id number]  Referring provider: Deep Boyd  Dx:   Encounter Diagnosis     ICD-10-CM    1. Arthritis of right glenohumeral joint  M19.011 Ambulatory Referral to Physical Therapy      2. Arthritis of right acromioclavicular joint  M19.011 Ambulatory Referral to Physical Therapy      3. Subacromial impingement of right shoulder  M75.41 Ambulatory Referral to Physical Therapy      4. Trapezius muscle spasm  M62.838 Ambulatory Referral to Physical Therapy      5. Biceps strain, right, subsequent encounter  S46.211D Ambulatory Referral to Physical Therapy      6. Tendinitis of right rotator cuff  M75.81 Ambulatory Referral to Physical Therapy                     Assessment  Assessment details: Pt is a 56 y/o female who presents to physical therapy with primary nociceptive pain associated with R rotator cuff related shoulder pain complicated by consistent loading of the RTC due to pickleball and water aerobics. Pt does not present with any red flag symptoms at this time. Pain improving as well as function. She reports she is able to reach behind her in the car now with minimal pain. She is still playing pickleball and reporting that she is having less pain with this. She continues to have pain and a reduction in strength/painfree ROM. Pt would continue to benefit from skilled physical therapy in order to decrease deficits and return to prior level of function.     Impairments: abnormal or restricted ROM, activity intolerance, impaired physical strength and pain with function  Understanding of Dx/Px/POC: good  Plan  Patient would benefit from: skilled physical therapy  Planned modality interventions: cryotherapy  Planned therapy interventions: manual therapy, neuromuscular re-education, patient education, self care, strengthening, stretching, therapeutic activities, therapeutic exercise and home exercise program  Frequency: 1-2x/week. Duration in weeks: 6  Treatment plan discussed with: patient        Subjective Evaluation    History of Present Illness  Mechanism of injury: Chief Complaint: Pt reports that in the spring time she began noticing R shld pain especially at night if she was in an awkward position with her R shld (quadrant position). She reports it continued to get worse over the following months. She saw Dr. Rosario Chris and was given 14 days worth of an anti-inflammatory. She states that this reduced the pain but did not resolve it. She stopped the anti-inflammatory last Friday and notes that there has been some increase in pain since. 11/9: Pt reports some improvement since beginning physical therapy. She has maintained her exercise routine without changing it. RE (12/4): Pt reports shoulder 50-60% improvement since beginning physical therapy. She states her pain is improving especially with combination of neck treatment. Severity: low-moderate  Irritability: low  Nature: nociceptive  Stage: chronic  Stability: no change    P1: see body chart    Physical Activity: pt plays pickleball everyday of the week (started in February) as well as doing water aerobics most days and adult ballet 1x/week.  She reports some soreness with water aerobics as well as pickleball but it does not stop her from doing anything    Patient Goals  Patient goal: "no pain"        Objective     Postural Observations    Additional Postural Observation Details  Increased upper thoracic kyphosis, forward head posture    Observations     Additional Observation Details  *reach behind chair in car- 5-6/10 (horizontal abd with elbow straight)    Cervical/Thoracic Screen   Cervical range of motion within normal limits with the following exceptions: Extension quadrant with OP did not recreate symptoms, no difference in available movement b/l    Active Range of Motion     Additional Active Range of Motion Details  Flex: WNL no pain; WNL 3/10; RE (12/4): WNL 0/10  *Abd: WNL but painful arc around 120 (5-6/10); WNL 5/10; RE (12/4): WNL 0/10  *ER in neutral: WNL but pain at end range (5-6/10); 4/10 WNL; RE (12/4): WNL 1/10  *abd reach behind: RE (12/4): 4/10 WNL  Passive Range of Motion   Left Shoulder   Normal passive range of motion    Right Shoulder   Normal passive range of motion    Strength/Myotome Testing     Left Shoulder     Planes of Motion   Abduction: 5   External rotation at 0°: 5     Right Shoulder     Planes of Motion   Abduction: 4+   External rotation at 0°: 4+     Additional Strength Details  Painful ER    Tests     Right Shoulder   Positive Hawkin's. Cervical jt mobs  Hypomobility at R UPA the most limited and local tenderness, soft tissue restriction and local tenderness throughout the R paraspinals    Reduced mobility of the CTJ       Precautions: None    POC expires Unit limit Auth Expiration date PT/OT + Visit Limit?   11/7/23 6 combo  BOMN                               Therapeutic exercise = (TE)  Therapeutic activity= (TA)  Manual Therapy= (MT)  Neuromuscular re-education- (NRE)  Gait training= (GT)    10/27: *reach behind chair, abd, ER in neutral  TE 1. Reassessment  *ER - 5/10  *Abd- 5/10  *reach behind- 5-6/10  TE 2. Sidelying ER- metronome 3-0-4-0, 1x12  *reach behind the chair- no pain in 90d abd, 5-6/10 right at end range  *abd- no pain  *ER- 3-4/10  TE 3. Sidelying ER- metronome 3-0-4-0, 1x12  *reach behind the chair- no change  *abd- no change  *ER no pain  TE 4. Prone T- 3# R 3x12  *reach behind the chair- no change  *abd- no change  *ER no change  TE 5. Prone T- 5# 1x10  *worsened  TE 6. Pt education- metronome usage at home      11/1: *reach behind chair, abd, ER in neutral  TE 1. Reassessment  *ER - 0/10  *Abd- 2/10  *reach behind- 4-5/10  TE 2. Sidelying ER- metronome 3-0-4-0, 1x18, 1x15, 1x15 3#  *reach behind the chair- no pain in 90d abd, 5-6/10 right at end range  *abd- no pain  *ER- /10  TE 3. Prone T- 5# R 3x12  *reach behind the chair- no change  *abd- no change  *ER no change  MT 4. Abd shoulder mobs- EMILI ~10' gd 2-3 in increasing abd ROM inf glide to inf glide roll  *no change in asterisks  TE 5. Prone Y-  2x12  *no change in asterisks except abd no longer pain in mid range    11/9:  TE 1. Reassessment  MT 2. R UPA C5- gd IV-IV+ 2x3'  *shld ER no change  *Shld flex- no pain  *shld abd- no change  MT 3. Supine raking soft tissue technique- EMILI 5' with intermittent upglide mob  *shld flex- no pain  *Shld ER- reduced pain 3/10  *shld abd- reduced pain 3/10  TE 4. Cervical SNAG- HEP prescription    11/21:   *shld abd- 1/10  *Shld ER- 2/10  *reaching behind the back- 3/10  MT 1. Prone CPA T1-3- gd 3-3+ 10'  MT 2. Prone transverse spinous process- EMILI gd 3 10'  *shld abd- <1/10  *shld ER- <1/10  *reaching behind the back- 2-3/10  MT 3. Supine PNF cervical dorsal glide- 2x10  TE 4. Supine cervical dorsal glide over towel - 2x8 different positions  *shld abd- <1/10  *shld ER- <1/10  *reaching behind the back- 1-2/10    12/5:   *shld abd- 0/10  *Shld ER- 1/10  *reaching behind the back-4/10  MT 1. Supine strumming- EMILI 5'  MT 2. Supine R and L upglide- gd 3-4 EMILI 10'  *shld abd- 0/10  *shld ER- <1/10  *reaching behind the back- 3-4/10  NRE 3. Supine cervical dorsal glide over towel - 3x10 different positions  *shld abd- 0/10  *shld ER- <1/10  *reaching behind the back- 2-3/10  NRE 4.  Prone cervical retraction scapular retraction with ER- 10x10"  *shld abd- 0/10  *shld ER- <1/10  *reaching behind the back- 2-3/10

## 2023-12-12 ENCOUNTER — OFFICE VISIT (OUTPATIENT)
Dept: PHYSICAL THERAPY | Facility: CLINIC | Age: 63
End: 2023-12-12
Payer: MEDICARE

## 2023-12-12 DIAGNOSIS — M75.81 TENDINITIS OF RIGHT ROTATOR CUFF: ICD-10-CM

## 2023-12-12 DIAGNOSIS — S46.211D BICEPS STRAIN, RIGHT, SUBSEQUENT ENCOUNTER: ICD-10-CM

## 2023-12-12 DIAGNOSIS — M75.41 SUBACROMIAL IMPINGEMENT OF RIGHT SHOULDER: ICD-10-CM

## 2023-12-12 DIAGNOSIS — M62.838 TRAPEZIUS MUSCLE SPASM: ICD-10-CM

## 2023-12-12 DIAGNOSIS — M19.011 ARTHRITIS OF RIGHT ACROMIOCLAVICULAR JOINT: ICD-10-CM

## 2023-12-12 DIAGNOSIS — M19.011 ARTHRITIS OF RIGHT GLENOHUMERAL JOINT: Primary | ICD-10-CM

## 2023-12-12 PROCEDURE — 97140 MANUAL THERAPY 1/> REGIONS: CPT | Performed by: PHYSICAL THERAPIST

## 2023-12-12 PROCEDURE — 97112 NEUROMUSCULAR REEDUCATION: CPT | Performed by: PHYSICAL THERAPIST

## 2023-12-12 NOTE — PROGRESS NOTES
Daily Note     Today's date: 2023  Patient name: Cortney Saini  : 1960  MRN: [id number]  Referring provider: Celine Wetzel  Dx:   Encounter Diagnosis     ICD-10-CM    1. Arthritis of right glenohumeral joint  M19.011       2. Subacromial impingement of right shoulder  M75.41       3. Arthritis of right acromioclavicular joint  M19.011       4. Trapezius muscle spasm  M62.838       5. Biceps strain, right, subsequent encounter  S46.211D       6. Tendinitis of right rotator cuff  M75.81                      Subjective: Pt reports that she was able to wash the top of the front  with her R arm with minimal pain for the first time in awhile. Objective: See treatment diary below      Assessment: Tolerated treatment well. Lack of cervical isometric power in prone exercise. Cueing for maintaining the cervical position with scapular retraction, pt able to demonstrate with better form following, but still a slight inability to maintain the isometric position. No change to HEP at this time, just to improve form with prone exercise. Patient would benefit from continued PT      Plan: Continue per plan of care. Progress treatment as tolerated. Precautions: None    POC expires Unit limit Auth Expiration date PT/OT + Visit Limit?   23 6 combo  BOMN                               Therapeutic exercise = (TE)  Therapeutic activity= (TA)  Manual Therapy= (MT)  Neuromuscular re-education- (NRE)  Gait training= (GT)    TE 1. ER isometric- 3x45s 30%   *reach behind chair- increased to 6-7/10  *abd- no change  *ER in neutral- no pain  TE 2. Pt education- reduced activity in pickleball with the R shld, HEP    NV: *reach behind chair, abd, ER in neutral  TE 1. Assessed thoracic motion- no change b/l  TE 2. RTC isometric- 3x45" 75%  *reach behind the chair- no change  *abd- no change  *ER- Slightly improved  TE 3.  RTC isometric- 3x45" 75%  *reach behind the chair- no change  *abd- no change  *ER no pain  TE 4. Prone T- 3# R 1x12, 1x14; L 1x16, 1x16  *reach behind the chair- no change  *abd- no pain  *ER no pain  MT 5. Hip ROM assessment- reduced R hip IR, L hip pain with MMT hip IR    10/27: *reach behind chair, abd, ER in neutral  TE 1. Reassessment  *ER - 5/10  *Abd- 5/10  *reach behind- 5-6/10  TE 2. Sidelying ER- metronome 3-0-4-0, 1x12  *reach behind the chair- no pain in 90d abd, 5-6/10 right at end range  *abd- no pain  *ER- 3-4/10  TE 3. Sidelying ER- metronome 3-0-4-0, 1x12  *reach behind the chair- no change  *abd- no change  *ER no pain  TE 4. Prone T- 3# R 3x12  *reach behind the chair- no change  *abd- no change  *ER no change  TE 5. Prone T- 5# 1x10  *worsened  TE 6. Pt education- metronome usage at home      10/27: *reach behind chair, abd, ER in neutral  TE 1. Reassessment  *ER - 0/10  *Abd- 2/10  *reach behind- 4-5/10  TE 2. Sidelying ER- metronome 3-0-4-0, 1x18, 1x15, 1x15 3#  *reach behind the chair- no pain in 90d abd, 5-6/10 right at end range  *abd- no pain  *ER- /10  TE 3. Prone T- 5# R 3x12  *reach behind the chair- no change  *abd- no change  *ER no change  MT 4. Abd shoulder mobs- EMILI ~10' gd 2-3 in increasing abd ROM inf glide to inf glide roll  *no change in asterisks  TE 5. Prone Y-  2x12  *no change in asterisks except abd no longer pain in mid range    11/9:  TE 1. Reassessment  MT 2. R UPA C5- gd IV-IV+ 2x3'  *shld ER no change  *Shld flex- no pain  *shld abd- no change  MT 3. Supine raking soft tissue technique- EMILI 5' with intermittent upglide mob  *shld flex- no pain  *Shld ER- reduced pain 3/10  *shld abd- reduced pain 3/10  TE 4. Cervical SNAG- HEP prescription    11/14:   *shld abd- 4/10  *Shld ER- 4/10  *reaching behind the back- 5-6/10  TE 1. Education on rest and acceptable pain level- 13'  MT 2. R UPA C5- gd IV-IV+ 2x3'  *shld abd- no pain  *Shld ER- 4/10  *shld abd- 4-5/10    11/21:   *shld abd- 1/10  *Shld ER- 1/10  *reaching behind the back- 3/10  MT 1. R UPA C5- gd IV-IV+ 2x3'  *shld abd- <1/10  *Shld ER- 1/10  *shld abd- 3/10  MT 2. Shld inf glide mob- gd 2 with increasing abduction ROM 10'  *shld abd- <1/10  *shld ER- <1/10  *reaching behind the back- 2-3/10    11/21:   *shld abd- 1/10  *Shld ER- 2/10  *reaching behind the back- 3/10  MT 1. Prone CPA T1-3- gd 3-3+ 10'  MT 2. Prone transverse spinous process- EMILI gd 3 10'  *shld abd- <1/10  *shld ER- <1/10  *reaching behind the back- 2-3/10  MT 3. Supine PNF cervical dorsal glide- 2x10  TE 4. Supine cervical dorsal glide over towel - 2x8 different positions  *shld abd- <1/10  *shld ER- <1/10  *reaching behind the back- 1-2/10    12/12:   *shld abd- 0/10  *Shld ER- 1/10  *reaching behind the back-2-3/10  MT 1. Supine strumming- EMILI 5'  MT 2. Supine R to L side glide C5 on the R- 5x1'   *shld abd- 0/10  *shld ER- 0/10  *reaching behind the back- 1-2/10  NRE 3. Supine cervical dorsal glide over towel - 3x10 different positions  *shld abd- 0/10  *shld ER- 0/10  *reaching behind the back- 1-2/10  NRE 4.  Prone cervical retraction scapular retraction with ER- 10x10"  *shld abd- <1/10  *shld ER- <1/10  *reaching behind the back- 2-3/10

## 2023-12-19 ENCOUNTER — OFFICE VISIT (OUTPATIENT)
Dept: PHYSICAL THERAPY | Facility: CLINIC | Age: 63
End: 2023-12-19
Payer: MEDICARE

## 2023-12-19 DIAGNOSIS — M19.011 ARTHRITIS OF RIGHT GLENOHUMERAL JOINT: Primary | ICD-10-CM

## 2023-12-19 DIAGNOSIS — M75.41 SUBACROMIAL IMPINGEMENT OF RIGHT SHOULDER: ICD-10-CM

## 2023-12-19 DIAGNOSIS — M19.011 ARTHRITIS OF RIGHT ACROMIOCLAVICULAR JOINT: ICD-10-CM

## 2023-12-19 DIAGNOSIS — M75.81 TENDINITIS OF RIGHT ROTATOR CUFF: ICD-10-CM

## 2023-12-19 DIAGNOSIS — S46.211D BICEPS STRAIN, RIGHT, SUBSEQUENT ENCOUNTER: ICD-10-CM

## 2023-12-19 DIAGNOSIS — M62.838 TRAPEZIUS MUSCLE SPASM: ICD-10-CM

## 2023-12-19 PROCEDURE — 97140 MANUAL THERAPY 1/> REGIONS: CPT | Performed by: PHYSICAL THERAPIST

## 2023-12-19 NOTE — PROGRESS NOTES
"Daily Note     Today's date: 2023  Patient name: Elena Fuchs  : 1960  MRN: [id number]  Referring provider: Carolina Abreu*  Dx:   Encounter Diagnosis     ICD-10-CM    1. Arthritis of right glenohumeral joint  M19.011       2. Subacromial impingement of right shoulder  M75.41       3. Arthritis of right acromioclavicular joint  M19.011       4. Trapezius muscle spasm  M62.838       5. Biceps strain, right, subsequent encounter  S46.211D       6. Tendinitis of right rotator cuff  M75.81                      Subjective: Pt reports falling while playing pickleball and landed in R FOOSH position. She reports having some soreness in the R tricep region that resolved after a few days. She reports only having instantaneous pain with pickleball if she goes to slam, otherwise it is ok.       Objective: See treatment diary below      Assessment: Tolerated treatment well. Asterisks much improved. Following manual treament, pt reported no pain with any asterisk, the first time in this period of treatment. Pt nearing d/c. Patient would benefit from continued PT      Plan: Continue per plan of care.  Progress treatment as tolerated.       Precautions: None    POC expires Unit limit Auth Expiration date PT/OT + Visit Limit?   23 6 combo  BOMN                               Therapeutic exercise = (TE)  Therapeutic activity= (TA)  Manual Therapy= (MT)  Neuromuscular re-education- (NRE)  Gait training= (GT)    TE 1. ER isometric- 3x45s 30%   *reach behind chair- increased to 6-7/10  *abd- no change  *ER in neutral- no pain  TE 2. Pt education- reduced activity in pickleball with the R shld, HEP    NV: *reach behind chair, abd, ER in neutral  TE 1. Assessed thoracic motion- no change b/l  TE 2. RTC isometric- 3x45\" 75%  *reach behind the chair- no change  *abd- no change  *ER- Slightly improved  TE 3. RTC isometric- 3x45\" 75%  *reach behind the chair- no change  *abd- no change  *ER no pain  TE 4. Prone " T- 3# R 1x12, 1x14; L 1x16, 1x16  *reach behind the chair- no change  *abd- no pain  *ER no pain  MT 5. Hip ROM assessment- reduced R hip IR, L hip pain with MMT hip IR    10/27: *reach behind chair, abd, ER in neutral  TE 1. Reassessment  *ER - 5/10  *Abd- 5/10  *reach behind- 5-6/10  TE 2. Sidelying ER- metronome 3-0-4-0, 1x12  *reach behind the chair- no pain in 90d abd, 5-6/10 right at end range  *abd- no pain  *ER- 3-4/10  TE 3. Sidelying ER- metronome 3-0-4-0, 1x12  *reach behind the chair- no change  *abd- no change  *ER no pain  TE 4. Prone T- 3# R 3x12  *reach behind the chair- no change  *abd- no change  *ER no change  TE 5. Prone T- 5# 1x10  *worsened  TE 6. Pt education- metronome usage at home      10/27: *reach behind chair, abd, ER in neutral  TE 1. Reassessment  *ER - 0/10  *Abd- 2/10  *reach behind- 4-5/10  TE 2. Sidelying ER- metronome 3-0-4-0, 1x18, 1x15, 1x15 3#  *reach behind the chair- no pain in 90d abd, 5-6/10 right at end range  *abd- no pain  *ER- /10  TE 3. Prone T- 5# R 3x12  *reach behind the chair- no change  *abd- no change  *ER no change  MT 4. Abd shoulder mobs- EMILI ~10' gd 2-3 in increasing abd ROM inf glide to inf glide roll  *no change in asterisks  TE 5. Prone Y-  2x12  *no change in asterisks except abd no longer pain in mid range    11/9:  TE 1. Reassessment  MT 2. R UPA C5- gd IV-IV+ 2x3'  *shld ER no change  *Shld flex- no pain  *shld abd- no change  MT 3. Supine raking soft tissue technique- EMILI 5' with intermittent upglide mob  *shld flex- no pain  *Shld ER- reduced pain 3/10  *shld abd- reduced pain 3/10  TE 4. Cervical SNAG- HEP prescription    11/14:   *shld abd- 4/10  *Shld ER- 4/10  *reaching behind the back- 5-6/10  TE 1. Education on rest and acceptable pain level- 13'  MT 2. R UPA C5- gd IV-IV+ 2x3'  *shld abd- no pain  *Shld ER- 4/10  *shld abd- 4-5/10    11/21:   *shld abd- 1/10  *Shld ER- 1/10  *reaching behind the back- 3/10  MT 1. R UPA C5- gd IV-IV+ 2x3'  *shld  "abd- <1/10  *Shld ER- 1/10  *shld abd- 3/10  MT 2. Shld inf glide mob- gd 2 with increasing abduction ROM 10'  *shld abd- <1/10  *shld ER- <1/10  *reaching behind the back- 2-3/10    11/21:   *shld abd- 1/10  *Shld ER- 2/10  *reaching behind the back- 3/10  MT 1. Prone CPA T1-3- gd 3-3+ 10'  MT 2. Prone transverse spinous process- EMILI gd 3 10'  *shld abd- <1/10  *shld ER- <1/10  *reaching behind the back- 2-3/10  MT 3. Supine PNF cervical dorsal glide- 2x10  TE 4. Supine cervical dorsal glide over towel - 2x8 different positions  *shld abd- <1/10  *shld ER- <1/10  *reaching behind the back- 1-2/10    12/12:   *shld abd- 0/10  *Shld ER- 1/10  *reaching behind the back-2-3/10  MT 1. Supine strumming- EMILI 5'  MT 2. Supine R to L side glide C5 on the R- 5x1'   *shld abd- 0/10  *shld ER- 0/10  *reaching behind the back- 1-2/10  NRE 3. Supine cervical dorsal glide over towel - 3x10 different positions  *shld abd- 0/10  *shld ER- 0/10  *reaching behind the back- 1-2/10  NRE 4. Prone cervical retraction scapular retraction with ER- 10x10\"  *shld abd- <1/10  *shld ER- <1/10  *reaching behind the back- 2-3/10    12/19:   *shld abd- 0/10  *Shld ER- <1/10  *reaching behind the back-3-4/10  MT 1. Supine cervical strumming- EMILI 10'  MT 2. Inf GH jt mob with increasing abd ROM gd 3 oscillations- 10'  *shld abd- 0/10  *shld ER- 0/10  *reaching behind the back- <1/10     "

## 2023-12-26 ENCOUNTER — APPOINTMENT (OUTPATIENT)
Dept: PHYSICAL THERAPY | Facility: CLINIC | Age: 63
End: 2023-12-26
Payer: MEDICARE

## 2024-09-25 ENCOUNTER — TELEPHONE (OUTPATIENT)
Age: 64
End: 2024-09-25

## 2025-07-12 ENCOUNTER — OFFICE VISIT (OUTPATIENT)
Age: 65
End: 2025-07-12
Payer: MEDICARE

## 2025-07-12 VITALS
RESPIRATION RATE: 16 BRPM | OXYGEN SATURATION: 99 % | SYSTOLIC BLOOD PRESSURE: 148 MMHG | TEMPERATURE: 96.7 F | WEIGHT: 233 LBS | DIASTOLIC BLOOD PRESSURE: 72 MMHG | BODY MASS INDEX: 36.49 KG/M2 | HEART RATE: 65 BPM

## 2025-07-12 DIAGNOSIS — L55.9 SUNBURN: Primary | ICD-10-CM

## 2025-07-12 PROCEDURE — 99213 OFFICE O/P EST LOW 20 MIN: CPT | Performed by: PHYSICIAN ASSISTANT

## 2025-07-12 PROCEDURE — G0463 HOSPITAL OUTPT CLINIC VISIT: HCPCS | Performed by: PHYSICIAN ASSISTANT

## 2025-07-12 RX ORDER — CLINDAMYCIN HYDROCHLORIDE 300 MG/1
CAPSULE ORAL
COMMUNITY
Start: 2025-07-10

## 2025-07-12 RX ORDER — ETODOLAC 400 MG/1
400 TABLET, FILM COATED ORAL 2 TIMES DAILY
COMMUNITY
Start: 2025-07-10
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
## 2025-07-12 NOTE — PATIENT INSTRUCTIONS
Mild to moderate sunburn -- For mild to moderate sunburn (mild to moderate redness, skin sensitivity to heat and mechanical pressure), management involves symptomatic treatment of skin inflammation and control of pain.  ?Cool compresses or soaks, calamine lotion, or aloe vera-based gels provide some relief of pain and discomfort. [place] emollients (eg, liquid paraffin/white soft paraffin 50/50) can be used on intact skin as tolerated. Ruptured blisters should be gently cleaned with mild soap and water and covered with wet dressings (eg, saline or petrolatum impregnated gauzes). Blisters should have the roof left on   ?For the treatment of skin pain and inflammation, we suggest oral nonsteroidal anti-inflammatory drugs (NSAIDs). We generally use ibuprofen at a dose of 400 to 800 mg per dose three to four times per day in adults and children >12 years and 4 to 10 mg/kg per dose every six to eight hours in children 6 months to 12 years. Treatment should be initiated as soon as the first symptoms become apparent and continued for 24 to 48 hours.  Topical diclofenac gel has been reported as effective in reducing pain and inflammation from sunburn [46]. However, topical diclofenac may induce allergic contact dermatitis and photoallergic contact dermatitis [[id number]].  ?We do not use topical corticosteroids for the treatment of sunburn. Although they are frequently used in clinical practice, there is little evidence that they are beneficial in reducing the symptoms and healing time of sunburn.    Severe sunburn -- Patients with extensive blistering sunburn, severe pain, and systemic symptoms (eg, fever, headache, vomiting, dehydration) may require hospitalization for fluid replacement and parenteral analgesia.   Blistered areas should be gently cleaned with mild soap and water and covered with sterile dressings. Topical antimicrobials or antibiotics (eg, silver sulfadiazine or mupirocin 2% ointment) may be used to prevent  bacterial superinfection.   There is no evidence that oral corticosteroids are useful in severe sunburn. In a small study, participants received prednisone 80 mg or placebo immediately after UVB irradiation and for the following three days [Prednisone was not more effective than placebo in reducing erythema, edema, and pain of the irradiated sites.

## 2025-07-16 ENCOUNTER — OFFICE VISIT (OUTPATIENT)
Age: 65
End: 2025-07-16
Payer: COMMERCIAL

## 2025-07-16 VITALS
RESPIRATION RATE: 18 BRPM | HEART RATE: 71 BPM | HEIGHT: 67 IN | WEIGHT: 235.2 LBS | BODY MASS INDEX: 36.91 KG/M2 | OXYGEN SATURATION: 98 % | DIASTOLIC BLOOD PRESSURE: 82 MMHG | SYSTOLIC BLOOD PRESSURE: 144 MMHG | TEMPERATURE: 97.9 F

## 2025-07-16 DIAGNOSIS — E05.90 HYPERTHYROIDISM: ICD-10-CM

## 2025-07-16 DIAGNOSIS — Z78.0 POST-MENOPAUSAL: ICD-10-CM

## 2025-07-16 DIAGNOSIS — L55.9 BURN FROM THE SUN: ICD-10-CM

## 2025-07-16 DIAGNOSIS — Z00.00 ANNUAL PHYSICAL EXAM: Primary | ICD-10-CM

## 2025-07-16 DIAGNOSIS — R21 RASH: ICD-10-CM

## 2025-07-16 DIAGNOSIS — Z12.11 COLON CANCER SCREENING: ICD-10-CM

## 2025-07-16 DIAGNOSIS — E04.2 MULTINODULAR GOITER: ICD-10-CM

## 2025-07-16 DIAGNOSIS — E78.2 MIXED HYPERLIPIDEMIA: ICD-10-CM

## 2025-07-16 DIAGNOSIS — Z12.31 ENCOUNTER FOR SCREENING MAMMOGRAM FOR BREAST CANCER: ICD-10-CM

## 2025-07-16 DIAGNOSIS — R73.03 PREDIABETES: ICD-10-CM

## 2025-07-16 PROCEDURE — 99397 PER PM REEVAL EST PAT 65+ YR: CPT | Performed by: FAMILY MEDICINE

## 2025-07-16 PROCEDURE — 99214 OFFICE O/P EST MOD 30 MIN: CPT | Performed by: FAMILY MEDICINE

## 2025-07-16 RX ORDER — CLOTRIMAZOLE AND BETAMETHASONE DIPROPIONATE 10; .64 MG/G; MG/G
CREAM TOPICAL 2 TIMES DAILY
Qty: 30 G | Refills: 0 | Status: SHIPPED | OUTPATIENT
Start: 2025-07-16

## 2025-07-16 NOTE — PATIENT INSTRUCTIONS
"Patient Education     Routine physical for adults   The Basics   Written by the doctors and editors at [facility]   What is a physical? -- A physical is a routine visit, or \"check-up,\" with your doctor. You might also hear it called a \"wellness visit\" or \"preventive visit.\"  During each visit, the doctor will:   Ask about your physical and mental health   Ask about your habits, behaviors, and lifestyle   Do an exam   Give you vaccines if needed   Talk to you about any medicines you take   Give advice about your health   Answer your questions  Getting regular check-ups is an important part of taking care of your health. It can help your doctor find and treat any problems you have. But it's also important for preventing health problems.  A routine physical is different from a \"sick visit.\" A sick visit is when you see a doctor because of a health concern or problem. Since physicals are scheduled ahead of time, you can think about what you want to ask the doctor.  How often should I get a physical? -- It depends on your age and health. In general, for people age 21 years and older:   If you are younger than 50 years, you might be able to get a physical every 3 years.   If you are 50 years or older, your doctor might recommend a physical every year.  If you have an ongoing health condition, like diabetes or high blood pressure, your doctor will probably want to see you more often.  What happens during a physical? -- In general, each visit will include:   Physical exam - The doctor or nurse will check your height, weight, heart rate, and blood pressure. They will also look at your eyes and ears. They will ask about how you are feeling and whether you have any symptoms that bother you.   Medicines - It's a good idea to bring a list of all the medicines you take to each doctor visit. Your doctor will talk to you about your medicines and answer any questions. Tell them if you are having any side effects that bother you. You " "should also tell them if you are having trouble paying for any of your medicines.   Habits and behaviors - This includes:   Your diet   Your exercise habits   Whether you smoke, drink alcohol, or use drugs   Whether you are sexually active   Whether you feel safe at home  Your doctor will talk to you about things you can do to improve your health and lower your risk of health problems. They will also offer help and support. For example, if you want to quit smoking, they can give you advice and might prescribe medicines. If you want to improve your diet or get more physical activity, they can help you with this, too.   Lab tests, if needed - The tests you get will depend on your age and situation. For example, your doctor might want to check your:   Cholesterol   Blood sugar   Iron level   Vaccines - The recommended vaccines will depend on your age, health, and what vaccines you already had. Vaccines are very important because they can prevent certain serious or deadly infections.   Discussion of screening - \"Screening\" means checking for diseases or other health problems before they cause symptoms. Your doctor can recommend screening based on your age, risk, and preferences. This might include tests to check for:   Cancer, such as breast, prostate, cervical, ovarian, colorectal, prostate, lung, or skin cancer   Sexually transmitted infections, such as chlamydia and gonorrhea   Mental health conditions like depression and anxiety  Your doctor will talk to you about the different types of screening tests. They can help you decide which screenings to have. They can also explain what the results might mean.   Answering questions - The physical is a good time to ask the doctor or nurse questions about your health. If needed, they can refer you to other doctors or specialists, too.  Adults older than 65 years often need other care, too. As you get older, your doctor will talk to you about:   How to prevent falling at " home   Hearing or vision tests   Memory testing   How to take your medicines safely   Making sure that you have the help and support you need at home  All topics are updated as new evidence becomes available and our peer review process is complete.  This topic retrieved from [employer] on: May 02, 2024.  Topic 934335 Version 1.0  Release: 32.4.3 - C32.122  © 2024 UpToDate, Inc. and/or its affiliates. All rights reserved.  Consumer Information Use and Disclaimer   Disclaimer: This generalized information is a limited summary of diagnosis, treatment, and/or medication information. It is not meant to be comprehensive and should be used as a tool to help the user understand and/or assess potential diagnostic and treatment options. It does NOT include all information about conditions, treatments, medications, side effects, or risks that may apply to a specific patient. It is not intended to be medical advice or a substitute for the medical advice, diagnosis, or treatment of a health care provider based on the health care provider's examination and assessment of a patient's specific and unique circumstances. Patients must speak with a health care provider for complete information about their health, medical questions, and treatment options, including any risks or benefits regarding use of medications. This information does not endorse any treatments or medications as safe, effective, or approved for treating a specific patient. UpToDate, Inc. and its affiliates disclaim any warranty or liability relating to this information or the use thereof.The use of this information is governed by the Terms of Use, available at https://www.[employer].com/en/know/clinical-effectiveness-terms. 2024© UpToDate, Inc. and its affiliates and/or licensors. All rights reserved.  Copyright   © 2024 UpToDate, Inc. and/or its affiliates. All rights reserved.
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
## 2025-07-21 NOTE — ASSESSMENT & PLAN NOTE
Nodule stable per 7/17/2023 ultrasound.  Will continue monitoring via imaging q. 24 months.    Orders:    US thyroid; Future

## 2025-07-21 NOTE — ASSESSMENT & PLAN NOTE
Subclinical.  Previously managed by endocrinology.  Has not seen them since 2023.  Last TSH 3/2023 was 0.388 with normal free T4.  Patient is not on medication at this time.  Encouraged patient to reestablish with neurology.  Will obtain up-to-date study and treat accordingly  Orders:    TSH, 3rd generation with Free T4 reflex

## 2025-07-22 ENCOUNTER — TELEPHONE (OUTPATIENT)
Age: 65
End: 2025-07-22

## 2025-07-22 DIAGNOSIS — L55.9 BURN FROM THE SUN: Primary | ICD-10-CM

## 2025-07-22 RX ORDER — MUPIROCIN 2 %
OINTMENT (GRAM) TOPICAL 3 TIMES DAILY
Qty: 30 G | Refills: 0 | Status: SHIPPED | OUTPATIENT
Start: 2025-07-22

## 2025-07-22 NOTE — TELEPHONE ENCOUNTER
Patient called and stated that she saw Dr. Jackson last week on 7/16/25.  Where she was assess for sun burn/sun rash. Patient states that its getting better but she is noticing that the rash is spreading upward towards the armpit area. When she was in the office Dr. Jackson stated that the rash spreading to the front of the neck was normal but now that she is noticing it moving upward in the arms she is questioning if that is normal.     Patient states that she completed taking the antibiotic that she was prescribed for the toothache and she has some left over   etodolac (LODINE) 400 MG tablet . Patient asking if its okay to take it together with the Zyrtec D which she started taking to help with the itching.     Patient also asking if the   clotrimazole-betamethasone (LOTRISONE) 1-0.05 % cream can be apply to the area of the rash. Patient states that she know this cream was not prescribe for the sun rash but wants to know if it will help.     Please review and follow up with patient.

## 2025-07-22 NOTE — TELEPHONE ENCOUNTER
S/w Pt 's note read word for word and pt verbalized understanding of same.Advised cream was sent to [facility] the patient will call is not improving.

## 2025-07-22 NOTE — TELEPHONE ENCOUNTER
The degree of burn manifestation can continue even after exposure.  It is fine to take the etodolac as an oral anti-inflammatory.  It is safe to take with the antihistamine Zyrtec.  I am prescribing a topical antibiotic to use.  She will need to apply this topical antibiotic 2-3 times a day for at least 7 days.  She should continue to use the Vaseline gauze as well.

## 2025-08-02 LAB — COLOGUARD RESULT REPORTABLE: NEGATIVE

## 2025-08-13 ENCOUNTER — RESULTS FOLLOW-UP (OUTPATIENT)
Age: 65
End: 2025-08-13